# Patient Record
Sex: FEMALE | Race: BLACK OR AFRICAN AMERICAN | NOT HISPANIC OR LATINO
[De-identification: names, ages, dates, MRNs, and addresses within clinical notes are randomized per-mention and may not be internally consistent; named-entity substitution may affect disease eponyms.]

---

## 2017-01-17 ENCOUNTER — OTHER (OUTPATIENT)
Age: 27
End: 2017-01-17

## 2017-01-17 ENCOUNTER — TRANSCRIPTION ENCOUNTER (OUTPATIENT)
Age: 27
End: 2017-01-17

## 2017-06-06 ENCOUNTER — RX RENEWAL (OUTPATIENT)
Age: 27
End: 2017-06-06

## 2017-06-12 ENCOUNTER — APPOINTMENT (OUTPATIENT)
Dept: OBGYN | Facility: CLINIC | Age: 27
End: 2017-06-12

## 2017-06-12 VITALS
SYSTOLIC BLOOD PRESSURE: 118 MMHG | WEIGHT: 150 LBS | BODY MASS INDEX: 24.99 KG/M2 | DIASTOLIC BLOOD PRESSURE: 80 MMHG | HEIGHT: 65 IN

## 2017-06-12 DIAGNOSIS — Z01.419 ENCOUNTER FOR GYNECOLOGICAL EXAMINATION (GENERAL) (ROUTINE) W/OUT ABNORMAL FINDINGS: ICD-10-CM

## 2017-06-12 DIAGNOSIS — Z72.0 TOBACCO USE: ICD-10-CM

## 2017-06-16 LAB
C TRACH RRNA SPEC QL NAA+PROBE: NORMAL
N GONORRHOEA RRNA SPEC QL NAA+PROBE: NORMAL
SOURCE AMPLIFICATION: NORMAL

## 2017-06-30 ENCOUNTER — MEDICATION RENEWAL (OUTPATIENT)
Age: 27
End: 2017-06-30

## 2017-08-23 ENCOUNTER — RX RENEWAL (OUTPATIENT)
Age: 27
End: 2017-08-23

## 2018-01-29 ENCOUNTER — TRANSCRIPTION ENCOUNTER (OUTPATIENT)
Age: 28
End: 2018-01-29

## 2018-04-02 ENCOUNTER — TRANSCRIPTION ENCOUNTER (OUTPATIENT)
Age: 28
End: 2018-04-02

## 2018-06-07 ENCOUNTER — RX RENEWAL (OUTPATIENT)
Age: 28
End: 2018-06-07

## 2018-06-21 ENCOUNTER — TRANSCRIPTION ENCOUNTER (OUTPATIENT)
Age: 28
End: 2018-06-21

## 2018-08-08 ENCOUNTER — RX RENEWAL (OUTPATIENT)
Age: 28
End: 2018-08-08

## 2018-08-09 ENCOUNTER — TRANSCRIPTION ENCOUNTER (OUTPATIENT)
Age: 28
End: 2018-08-09

## 2018-09-10 ENCOUNTER — APPOINTMENT (OUTPATIENT)
Dept: OBGYN | Facility: CLINIC | Age: 28
End: 2018-09-10
Payer: COMMERCIAL

## 2018-09-10 DIAGNOSIS — Z30.09 ENCOUNTER FOR OTHER GENERAL COUNSELING AND ADVICE ON CONTRACEPTION: ICD-10-CM

## 2018-10-01 ENCOUNTER — APPOINTMENT (OUTPATIENT)
Dept: OBGYN | Facility: CLINIC | Age: 28
End: 2018-10-01
Payer: COMMERCIAL

## 2018-10-01 VITALS
DIASTOLIC BLOOD PRESSURE: 60 MMHG | SYSTOLIC BLOOD PRESSURE: 110 MMHG | HEIGHT: 65 IN | BODY MASS INDEX: 25.83 KG/M2 | WEIGHT: 155 LBS

## 2018-10-01 DIAGNOSIS — B00.2 HERPESVIRAL GINGIVOSTOMATITIS AND PHARYNGOTONSILLITIS: ICD-10-CM

## 2018-10-01 DIAGNOSIS — Z30.41 ENCOUNTER FOR SURVEILLANCE OF CONTRACEPTIVE PILLS: ICD-10-CM

## 2018-10-01 PROCEDURE — 99395 PREV VISIT EST AGE 18-39: CPT

## 2018-10-01 RX ORDER — NORETHINDRONE 0.35 MG/1
0.35 TABLET ORAL
Qty: 28 | Refills: 5 | Status: DISCONTINUED | COMMUNITY
Start: 2017-06-06 | End: 2018-10-01

## 2018-10-01 RX ORDER — NORETHINDRONE ACETATE AND ETHINYL ESTRADIOL AND FERROUS FUMARATE 1MG-20(21)
1-20 KIT ORAL DAILY
Qty: 1 | Refills: 5 | Status: DISCONTINUED | COMMUNITY
Start: 2017-06-29 | End: 2018-10-01

## 2018-10-03 LAB
C TRACH RRNA SPEC QL NAA+PROBE: NOT DETECTED
HPV HIGH+LOW RISK DNA PNL CVX: NOT DETECTED
N GONORRHOEA RRNA SPEC QL NAA+PROBE: NOT DETECTED
SOURCE AMPLIFICATION: NORMAL

## 2018-10-05 LAB — CYTOLOGY CVX/VAG DOC THIN PREP: NORMAL

## 2018-12-21 ENCOUNTER — TRANSCRIPTION ENCOUNTER (OUTPATIENT)
Age: 28
End: 2018-12-21

## 2018-12-26 ENCOUNTER — RX RENEWAL (OUTPATIENT)
Age: 28
End: 2018-12-26

## 2019-03-17 NOTE — OB HISTORY
[___] : no pregnancy complications reported [Pregnancy History] : patient received anesthesia [FreeTextEntry1] : 25-year-old  EDC 10/31/16\par Delivered at 40-3 weeks\par \par LMP 1/20 EDC 10/26\par 3/14 7-0/7 10/31\par \par Prenatal Issues\par •	Prior Tobacco use - not smoking in pregnancy\par •	TDAP September 12\par •	Influenza vaccination 10/6\par •	Herpes simplex - Valtrex suppression at 36 weeks\par •	Penicillin allergy\par \par Prenatal labs\par •	G/C (-) March 2016\par •	Electrophoresis normal pattern\par •	RPR (-)\par •	HCAb (-)\par •	HBSAg (-)\par •	Varicella immune\par •	Rubella immune\par •	HIV (-)\par •	TSH 1.77\par •	Blood type O (+)\par •	\par Genetic screen\par •	Progenity (-) female\par •	Ultrascreen (-)\par •	Mod Seq (-)\par •	Fragile X (-)\par •	SMA (-)\par •	CF (-)\par •	LEVEL II – anterior placenta, normal anatomy, Cx long\par •	3rd trimester\par •	CBC 13.4/12.9/37.6/309\par •	HIV (-)\par •	\par

## 2019-03-17 NOTE — HISTORY OF PRESENT ILLNESS
[1 Year Ago] : 1 year ago [Good] : being in good health [Reproductive Age] : is of reproductive age [de-identified] : Oct 2018 [Continuous] : no continuous [Dull] : no dull [Sharp] : no sharp [Throbbing] : no throbbing [Stabbing] : no stabbing [Burning] : no burning [Itching] : no itching [Mass] : no mass [Stinging] : no stinging [Lesion] : no lesion [Soreness] : no soreness [Discharge] : no discharge [Localized Pain] : no localized pain [Mass (___cm)] : no palpable mass [Diffused Pain] : no diffused pain [Nipple Discharge] : no nipple discharge [Skin Color Change] : no skin color change [FreeTextEntry9] : Patient has no Breast related concerns - Patient continues to breast-feed [Hot Flashes] : no hot flashes [Night Sweats] : no night sweats [Vaginal Itching] : no vaginal itching [Dyspareunia] : no dyspareunia [Mood Changes] : no mood changes [FreeTextEntry8] : Patient denies menopausal symptoms [Definite:  ___ (Date)] : the last menstrual period was [unfilled] [Normal Amount/Duration] : was of a normal amount and duration [Spotting Between  Menses] : no spotting between menses [Regular Cycle Intervals] : periods have been regular [Frequency: Q ___ days] : menstrual periods occur approximately every [unfilled] days [Menarche Age: ____] : age at menarche was [unfilled] [Menstrual Cramps] : no menstrual cramps [Currently In Menopause] : not currently in menopause [Experiencing Menopausal Sxs] : not experiencing menopausal symptoms [Sexually Active] : is sexually active [Monogamous] : is monogamous [Age of First Sexual [place] ___] : became sexually active at the age of [unfilled] [Contraception] : uses contraception [Oral Contraceptives] : uses oral contraceptives [Male ___] : [unfilled] male [de-identified] : current partner x 2005

## 2019-03-17 NOTE — CHIEF COMPLAINT
[Follow Up] : follow up GYN visit [FreeTextEntry1] : Patient was last seen  in October 2018\par Issues discussed:\par Annual examination\par Pap smear negative HPV DNA \par Gonorrhea and Chlamydia negative October 2018\par \par Contraception\par Patient discontinued oral contraception to get pregnant\par Patient has been trying for the last 5 months\par \par Family planning\par Ovulation awareness reviewed\par Prenatal vitamins advised

## 2019-03-18 ENCOUNTER — APPOINTMENT (OUTPATIENT)
Dept: OBGYN | Facility: CLINIC | Age: 29
End: 2019-03-18

## 2019-03-26 ENCOUNTER — APPOINTMENT (OUTPATIENT)
Dept: HUMAN REPRODUCTION | Facility: CLINIC | Age: 29
End: 2019-03-26
Payer: COMMERCIAL

## 2019-03-26 PROCEDURE — 99205 OFFICE O/P NEW HI 60 MIN: CPT

## 2019-03-26 PROCEDURE — 76830 TRANSVAGINAL US NON-OB: CPT

## 2019-05-01 ENCOUNTER — APPOINTMENT (OUTPATIENT)
Dept: HUMAN REPRODUCTION | Facility: CLINIC | Age: 29
End: 2019-05-01
Payer: COMMERCIAL

## 2019-05-01 ENCOUNTER — TRANSCRIPTION ENCOUNTER (OUTPATIENT)
Age: 29
End: 2019-05-01

## 2019-05-01 PROCEDURE — 99214 OFFICE O/P EST MOD 30 MIN: CPT

## 2019-05-14 ENCOUNTER — APPOINTMENT (OUTPATIENT)
Dept: HUMAN REPRODUCTION | Facility: CLINIC | Age: 29
End: 2019-05-14
Payer: COMMERCIAL

## 2019-05-14 PROCEDURE — 99213 OFFICE O/P EST LOW 20 MIN: CPT | Mod: 25

## 2019-05-14 PROCEDURE — 36415 COLL VENOUS BLD VENIPUNCTURE: CPT

## 2019-05-14 PROCEDURE — 76817 TRANSVAGINAL US OBSTETRIC: CPT

## 2019-05-22 ENCOUNTER — APPOINTMENT (OUTPATIENT)
Dept: HUMAN REPRODUCTION | Facility: CLINIC | Age: 29
End: 2019-05-22
Payer: COMMERCIAL

## 2019-05-22 PROCEDURE — 76817 TRANSVAGINAL US OBSTETRIC: CPT

## 2019-05-22 PROCEDURE — 36415 COLL VENOUS BLD VENIPUNCTURE: CPT

## 2019-05-22 PROCEDURE — 99213 OFFICE O/P EST LOW 20 MIN: CPT | Mod: 25

## 2019-05-29 ENCOUNTER — APPOINTMENT (OUTPATIENT)
Dept: HUMAN REPRODUCTION | Facility: CLINIC | Age: 29
End: 2019-05-29
Payer: COMMERCIAL

## 2019-05-29 PROCEDURE — 76817 TRANSVAGINAL US OBSTETRIC: CPT

## 2019-05-29 PROCEDURE — 99213 OFFICE O/P EST LOW 20 MIN: CPT | Mod: 25

## 2019-05-31 ENCOUNTER — APPOINTMENT (OUTPATIENT)
Dept: OBGYN | Facility: CLINIC | Age: 29
End: 2019-05-31

## 2019-05-31 NOTE — CHIEF COMPLAINT
[FreeTextEntry1] : \par \par \par Patient was last seen October 2018\par Issues discussed\par Annual examination\par Pap smear negative HPV DNA negative October 2018\par Gonorrhea and Chlamydia negative October 2018\par \par Contraception\par Patient discontinued oral contraception to get pregnant\par Patient has been trying for the last 5 months\par \par Family planning\par Ovulation awareness reviewed\par Prenatal vitamins advised\par \par \par

## 2019-05-31 NOTE — OB HISTORY
[Pregnancy History] : patient received anesthesia [___] : no pregnancy complications reported [FreeTextEntry1] : 25-year-old  EDC 10/31/16\par Delivered at 40-3 weeks\par \par LMP 1/20 EDC 10/26\par 3/14 7-0/7 10/31\par \par Prenatal Issues\par •	Prior Tobacco use - not smoking in pregnancy\par •	TDAP September 12\par •	Influenza vaccination 10/6\par •	Herpes simplex - Valtrex suppression at 36 weeks\par •	Penicillin allergy\par \par Prenatal labs\par •	G/C (-) March 2016\par •	Electrophoresis normal pattern\par •	RPR (-)\par •	HCAb (-)\par •	HBSAg (-)\par •	Varicella immune\par •	Rubella immune\par •	HIV (-)\par •	TSH 1.77\par •	Blood type O (+)\par •	\par Genetic screen\par •	Progenity (-) female\par •	Ultrascreen (-)\par •	Mod Seq (-)\par •	Fragile X (-)\par •	SMA (-)\par •	CF (-)\par •	LEVEL II – anterior placenta, normal anatomy, Cx long\par •	3rd trimester\par •	CBC 13.4/12.9/37.6/309\par •	HIV (-)\par •	\par

## 2019-05-31 NOTE — HISTORY OF PRESENT ILLNESS
[1 Year Ago] : 1 year ago [Good] : being in good health [Reproductive Age] : is of reproductive age [Definite:  ___ (Date)] : the last menstrual period was [unfilled] [Normal Amount/Duration] : was of a normal amount and duration [Regular Cycle Intervals] : periods have been regular [Frequency: Q ___ days] : menstrual periods occur approximately every [unfilled] days [Menarche Age: ____] : age at menarche was [unfilled] [Sexually Active] : is sexually active [Monogamous] : is monogamous [Age of First Sexual [place] ___] : became sexually active at the age of [unfilled] [Contraception] : uses contraception [Oral Contraceptives] : uses oral contraceptives [Male ___] : [unfilled] male [de-identified] : October 2018 [Continuous] : no continuous [Dull] : no dull [Sharp] : no sharp [Stabbing] : no stabbing [Throbbing] : no throbbing [Burning] : no burning [Itching] : no itching [Mass] : no mass [Stinging] : no stinging [Lesion] : no lesion [Soreness] : no soreness [Discharge] : no discharge [Localized Pain] : no localized pain [Mass (___cm)] : no palpable mass [Diffused Pain] : no diffused pain [Nipple Discharge] : no nipple discharge [Skin Color Change] : no skin color change [FreeTextEntry9] : Patient has no Breast related concerns - Patient continues to breast-feed [Hot Flashes] : no hot flashes [Night Sweats] : no night sweats [Vaginal Itching] : no vaginal itching [Dyspareunia] : no dyspareunia [Mood Changes] : no mood changes [FreeTextEntry8] : Patient denies menopausal symptoms [Spotting Between  Menses] : no spotting between menses [Menstrual Cramps] : no menstrual cramps [Currently In Menopause] : not currently in menopause [Experiencing Menopausal Sxs] : not experiencing menopausal symptoms [de-identified] : current partner x 2005

## 2019-06-03 ENCOUNTER — OUTPATIENT (OUTPATIENT)
Dept: OUTPATIENT SERVICES | Facility: HOSPITAL | Age: 29
LOS: 1 days | End: 2019-06-03
Payer: COMMERCIAL

## 2019-06-03 VITALS
TEMPERATURE: 99 F | SYSTOLIC BLOOD PRESSURE: 108 MMHG | WEIGHT: 158.95 LBS | HEIGHT: 65 IN | HEART RATE: 79 BPM | OXYGEN SATURATION: 98 % | RESPIRATION RATE: 18 BRPM | DIASTOLIC BLOOD PRESSURE: 78 MMHG

## 2019-06-03 DIAGNOSIS — Z01.818 ENCOUNTER FOR OTHER PREPROCEDURAL EXAMINATION: ICD-10-CM

## 2019-06-03 DIAGNOSIS — Z98.890 OTHER SPECIFIED POSTPROCEDURAL STATES: Chronic | ICD-10-CM

## 2019-06-03 DIAGNOSIS — Z98.891 HISTORY OF UTERINE SCAR FROM PREVIOUS SURGERY: Chronic | ICD-10-CM

## 2019-06-03 DIAGNOSIS — O02.1 MISSED ABORTION: ICD-10-CM

## 2019-06-03 DIAGNOSIS — J45.909 UNSPECIFIED ASTHMA, UNCOMPLICATED: ICD-10-CM

## 2019-06-03 LAB
BLD GP AB SCN SERPL QL: NEGATIVE — SIGNIFICANT CHANGE UP
HCT VFR BLD CALC: 39.1 % — SIGNIFICANT CHANGE UP (ref 34.5–45)
HGB BLD-MCNC: 13 G/DL — SIGNIFICANT CHANGE UP (ref 11.5–15.5)
MCHC RBC-ENTMCNC: 29.9 PG — SIGNIFICANT CHANGE UP (ref 27–34)
MCHC RBC-ENTMCNC: 33.2 GM/DL — SIGNIFICANT CHANGE UP (ref 32–36)
MCV RBC AUTO: 89.9 FL — SIGNIFICANT CHANGE UP (ref 80–100)
PLATELET # BLD AUTO: 384 K/UL — SIGNIFICANT CHANGE UP (ref 150–400)
RBC # BLD: 4.35 M/UL — SIGNIFICANT CHANGE UP (ref 3.8–5.2)
RBC # FLD: 13.2 % — SIGNIFICANT CHANGE UP (ref 10.3–14.5)
RH IG SCN BLD-IMP: POSITIVE — SIGNIFICANT CHANGE UP
WBC # BLD: 10.98 K/UL — HIGH (ref 3.8–10.5)
WBC # FLD AUTO: 10.98 K/UL — HIGH (ref 3.8–10.5)

## 2019-06-03 PROCEDURE — 86901 BLOOD TYPING SEROLOGIC RH(D): CPT

## 2019-06-03 PROCEDURE — 85027 COMPLETE CBC AUTOMATED: CPT

## 2019-06-03 PROCEDURE — G0463: CPT

## 2019-06-03 PROCEDURE — 86850 RBC ANTIBODY SCREEN: CPT

## 2019-06-03 PROCEDURE — 86900 BLOOD TYPING SEROLOGIC ABO: CPT

## 2019-06-03 RX ORDER — LIDOCAINE HCL 20 MG/ML
0.2 VIAL (ML) INJECTION ONCE
Refills: 0 | Status: DISCONTINUED | OUTPATIENT
Start: 2019-06-06 | End: 2019-06-21

## 2019-06-03 RX ORDER — SODIUM CHLORIDE 9 MG/ML
3 INJECTION INTRAMUSCULAR; INTRAVENOUS; SUBCUTANEOUS EVERY 8 HOURS
Refills: 0 | Status: DISCONTINUED | OUTPATIENT
Start: 2019-06-06 | End: 2019-06-21

## 2019-06-03 NOTE — H&P PST ADULT - NSICDXPROBLEM_GEN_ALL_CORE_FT
PROBLEM DIAGNOSES  Problem: Missed   Assessment and Plan: Dilation and Curettage  on 19  Pre- Op Instructions discussed    CBC,Type and screen sent    Problem: Asthma  Assessment and Plan: continue current medication

## 2019-06-03 NOTE — H&P PST ADULT - HISTORY OF PRESENT ILLNESS
30 y/o female , LMP- 3/31/19 , 8 weeks gestations  with missed  planned for Dilation and curettage for missed  on 19.

## 2019-06-05 ENCOUNTER — TRANSCRIPTION ENCOUNTER (OUTPATIENT)
Age: 29
End: 2019-06-05

## 2019-06-05 RX ORDER — OXYCODONE HYDROCHLORIDE 5 MG/1
5 TABLET ORAL ONCE
Refills: 0 | Status: DISCONTINUED | OUTPATIENT
Start: 2019-06-06 | End: 2019-06-06

## 2019-06-05 RX ORDER — ONDANSETRON 8 MG/1
4 TABLET, FILM COATED ORAL ONCE
Refills: 0 | Status: DISCONTINUED | OUTPATIENT
Start: 2019-06-06 | End: 2019-06-21

## 2019-06-05 RX ORDER — SODIUM CHLORIDE 9 MG/ML
1000 INJECTION, SOLUTION INTRAVENOUS
Refills: 0 | Status: DISCONTINUED | OUTPATIENT
Start: 2019-06-06 | End: 2019-06-21

## 2019-06-05 RX ORDER — CELECOXIB 200 MG/1
200 CAPSULE ORAL ONCE
Refills: 0 | Status: DISCONTINUED | OUTPATIENT
Start: 2019-06-06 | End: 2019-06-21

## 2019-06-06 ENCOUNTER — OUTPATIENT (OUTPATIENT)
Dept: OUTPATIENT SERVICES | Facility: HOSPITAL | Age: 29
LOS: 1 days | End: 2019-06-06
Payer: COMMERCIAL

## 2019-06-06 ENCOUNTER — RESULT REVIEW (OUTPATIENT)
Age: 29
End: 2019-06-06

## 2019-06-06 ENCOUNTER — APPOINTMENT (OUTPATIENT)
Dept: HUMAN REPRODUCTION | Facility: HOSPITAL | Age: 29
End: 2019-06-06
Payer: COMMERCIAL

## 2019-06-06 VITALS
DIASTOLIC BLOOD PRESSURE: 66 MMHG | HEART RATE: 80 BPM | OXYGEN SATURATION: 97 % | SYSTOLIC BLOOD PRESSURE: 98 MMHG | RESPIRATION RATE: 16 BRPM

## 2019-06-06 VITALS
SYSTOLIC BLOOD PRESSURE: 108 MMHG | DIASTOLIC BLOOD PRESSURE: 78 MMHG | HEART RATE: 80 BPM | TEMPERATURE: 99 F | OXYGEN SATURATION: 100 % | WEIGHT: 158.95 LBS | RESPIRATION RATE: 18 BRPM | HEIGHT: 65 IN

## 2019-06-06 DIAGNOSIS — Z98.891 HISTORY OF UTERINE SCAR FROM PREVIOUS SURGERY: Chronic | ICD-10-CM

## 2019-06-06 DIAGNOSIS — Z98.890 OTHER SPECIFIED POSTPROCEDURAL STATES: Chronic | ICD-10-CM

## 2019-06-06 DIAGNOSIS — O02.1 MISSED ABORTION: ICD-10-CM

## 2019-06-06 PROCEDURE — 88280 CHROMOSOME KARYOTYPE STUDY: CPT

## 2019-06-06 PROCEDURE — 59820 CARE OF MISCARRIAGE: CPT

## 2019-06-06 PROCEDURE — 88305 TISSUE EXAM BY PATHOLOGIST: CPT | Mod: 26

## 2019-06-06 PROCEDURE — 88264 CHROMOSOME ANALYSIS 20-25: CPT

## 2019-06-06 PROCEDURE — 88305 TISSUE EXAM BY PATHOLOGIST: CPT

## 2019-06-06 PROCEDURE — 88233 TISSUE CULTURE SKIN/BIOPSY: CPT

## 2019-06-06 RX ORDER — CELECOXIB 200 MG/1
200 CAPSULE ORAL ONCE
Refills: 0 | Status: COMPLETED | OUTPATIENT
Start: 2019-06-06 | End: 2019-06-06

## 2019-06-06 RX ORDER — ACETAMINOPHEN 500 MG
1000 TABLET ORAL ONCE
Refills: 0 | Status: COMPLETED | OUTPATIENT
Start: 2019-06-06 | End: 2019-06-06

## 2019-06-06 RX ADMIN — SODIUM CHLORIDE 3 MILLILITER(S): 9 INJECTION INTRAMUSCULAR; INTRAVENOUS; SUBCUTANEOUS at 10:18

## 2019-06-06 RX ADMIN — CELECOXIB 200 MILLIGRAM(S): 200 CAPSULE ORAL at 10:15

## 2019-06-06 RX ADMIN — Medication 1000 MILLIGRAM(S): at 10:14

## 2019-06-06 NOTE — BRIEF OPERATIVE NOTE - NSICDXBRIEFPROCEDURE_GEN_ALL_CORE_FT
PROCEDURES:  Dilation and curettage, uterus, using suction, for missed first trimester  2019 12:10:03  Laurie Heard

## 2019-06-06 NOTE — ASU DISCHARGE PLAN (ADULT/PEDIATRIC) - CARE PROVIDER_API CALL
Kwasi Ashley)  Obstetrics and Gynecology; Reproductive EndoInfertility  [address] [place] [address]  Phone: [phone]  Fax: [phone]  Follow Up Time:

## 2019-06-06 NOTE — ASU DISCHARGE PLAN (ADULT/PEDIATRIC) - CALL YOUR DOCTOR IF YOU HAVE ANY OF THE FOLLOWING:
Pain not relieved by Medications/Inability to tolerate liquids or foods/Fever greater than (need to indicate Fahrenheit or Celsius)/Bleeding that does not stop/Nausea and vomiting that does not stop/Unable to urinate

## 2019-06-06 NOTE — ASU DISCHARGE PLAN (ADULT/PEDIATRIC) - NURSING INSTRUCTIONS
Next dose of Tylenol will be on or after _____414pm______ ,today/tonight, If needed for pain/cramps. Your first dose of Tylenol was given at ___1014am________. Do not exceed more than 4000mg of Tylenol in one 24 hour setting.

## 2019-06-07 PROBLEM — J45.909 UNSPECIFIED ASTHMA, UNCOMPLICATED: Chronic | Status: ACTIVE | Noted: 2019-06-03

## 2019-06-10 LAB — SURGICAL PATHOLOGY STUDY: SIGNIFICANT CHANGE UP

## 2019-06-17 LAB — CHROM ANALY OVERALL INTERP SPEC-IMP: SIGNIFICANT CHANGE UP

## 2019-06-20 ENCOUNTER — APPOINTMENT (OUTPATIENT)
Dept: HUMAN REPRODUCTION | Facility: CLINIC | Age: 29
End: 2019-06-20
Payer: COMMERCIAL

## 2019-06-20 PROCEDURE — 76830 TRANSVAGINAL US NON-OB: CPT

## 2019-06-20 PROCEDURE — 99213 OFFICE O/P EST LOW 20 MIN: CPT | Mod: 25

## 2019-06-20 PROCEDURE — 84702 CHORIONIC GONADOTROPIN TEST: CPT

## 2019-08-23 ENCOUNTER — APPOINTMENT (OUTPATIENT)
Dept: HUMAN REPRODUCTION | Facility: CLINIC | Age: 29
End: 2019-08-23
Payer: COMMERCIAL

## 2019-08-23 PROCEDURE — 99213 OFFICE O/P EST LOW 20 MIN: CPT | Mod: 25

## 2019-08-23 PROCEDURE — 76830 TRANSVAGINAL US NON-OB: CPT

## 2019-09-19 ENCOUNTER — APPOINTMENT (OUTPATIENT)
Dept: HUMAN REPRODUCTION | Facility: CLINIC | Age: 29
End: 2019-09-19
Payer: COMMERCIAL

## 2019-09-19 PROCEDURE — 76830 TRANSVAGINAL US NON-OB: CPT

## 2019-09-19 PROCEDURE — 99213 OFFICE O/P EST LOW 20 MIN: CPT | Mod: 25

## 2019-09-27 ENCOUNTER — APPOINTMENT (OUTPATIENT)
Dept: HUMAN REPRODUCTION | Facility: CLINIC | Age: 29
End: 2019-09-27
Payer: COMMERCIAL

## 2019-09-27 PROCEDURE — 99213 OFFICE O/P EST LOW 20 MIN: CPT | Mod: 25

## 2019-09-27 PROCEDURE — 76830 TRANSVAGINAL US NON-OB: CPT

## 2019-09-30 ENCOUNTER — APPOINTMENT (OUTPATIENT)
Dept: HUMAN REPRODUCTION | Facility: CLINIC | Age: 29
End: 2019-09-30
Payer: COMMERCIAL

## 2019-09-30 PROCEDURE — 76830 TRANSVAGINAL US NON-OB: CPT

## 2019-09-30 PROCEDURE — 99213 OFFICE O/P EST LOW 20 MIN: CPT | Mod: 25

## 2019-10-02 ENCOUNTER — APPOINTMENT (OUTPATIENT)
Dept: HUMAN REPRODUCTION | Facility: CLINIC | Age: 29
End: 2019-10-02
Payer: COMMERCIAL

## 2019-10-02 ENCOUNTER — APPOINTMENT (OUTPATIENT)
Dept: HUMAN REPRODUCTION | Facility: CLINIC | Age: 29
End: 2019-10-02

## 2019-10-02 PROCEDURE — 58322 ARTIFICIAL INSEMINATION: CPT

## 2019-10-02 PROCEDURE — 76830 TRANSVAGINAL US NON-OB: CPT

## 2019-10-02 PROCEDURE — 89261 SPERM ISOLATION COMPLEX: CPT

## 2019-10-02 PROCEDURE — 99213 OFFICE O/P EST LOW 20 MIN: CPT | Mod: 25

## 2019-10-18 ENCOUNTER — APPOINTMENT (OUTPATIENT)
Dept: HUMAN REPRODUCTION | Facility: CLINIC | Age: 29
End: 2019-10-18
Payer: COMMERCIAL

## 2019-10-18 PROCEDURE — 99213Y: CUSTOM | Mod: 25

## 2019-10-18 PROCEDURE — 76830 TRANSVAGINAL US NON-OB: CPT

## 2019-10-25 ENCOUNTER — APPOINTMENT (OUTPATIENT)
Dept: HUMAN REPRODUCTION | Facility: CLINIC | Age: 29
End: 2019-10-25
Payer: COMMERCIAL

## 2019-10-25 PROCEDURE — 76830 TRANSVAGINAL US NON-OB: CPT

## 2019-10-25 PROCEDURE — 99213 OFFICE O/P EST LOW 20 MIN: CPT | Mod: 25

## 2019-10-29 ENCOUNTER — APPOINTMENT (OUTPATIENT)
Dept: HUMAN REPRODUCTION | Facility: CLINIC | Age: 29
End: 2019-10-29
Payer: COMMERCIAL

## 2019-10-29 ENCOUNTER — APPOINTMENT (OUTPATIENT)
Dept: HUMAN REPRODUCTION | Facility: CLINIC | Age: 29
End: 2019-10-29

## 2019-10-29 PROCEDURE — 99213 OFFICE O/P EST LOW 20 MIN: CPT | Mod: 25

## 2019-10-29 PROCEDURE — 76830 TRANSVAGINAL US NON-OB: CPT

## 2019-10-29 PROCEDURE — 89261 SPERM ISOLATION COMPLEX: CPT

## 2019-10-29 PROCEDURE — 58322 ARTIFICIAL INSEMINATION: CPT

## 2019-11-13 ENCOUNTER — APPOINTMENT (OUTPATIENT)
Dept: HUMAN REPRODUCTION | Facility: CLINIC | Age: 29
End: 2019-11-13
Payer: COMMERCIAL

## 2019-11-13 PROCEDURE — 99213 OFFICE O/P EST LOW 20 MIN: CPT | Mod: 25

## 2019-11-13 PROCEDURE — 36415 COLL VENOUS BLD VENIPUNCTURE: CPT

## 2019-11-13 PROCEDURE — 76830 TRANSVAGINAL US NON-OB: CPT

## 2019-11-26 ENCOUNTER — APPOINTMENT (OUTPATIENT)
Dept: HUMAN REPRODUCTION | Facility: CLINIC | Age: 29
End: 2019-11-26
Payer: COMMERCIAL

## 2019-11-26 PROCEDURE — 76830 TRANSVAGINAL US NON-OB: CPT

## 2019-11-26 PROCEDURE — 58322 ARTIFICIAL INSEMINATION: CPT

## 2019-11-26 PROCEDURE — 99213 OFFICE O/P EST LOW 20 MIN: CPT | Mod: 25

## 2019-11-26 PROCEDURE — 89261 SPERM ISOLATION COMPLEX: CPT

## 2019-12-09 ENCOUNTER — APPOINTMENT (OUTPATIENT)
Dept: HUMAN REPRODUCTION | Facility: CLINIC | Age: 29
End: 2019-12-09

## 2019-12-11 ENCOUNTER — APPOINTMENT (OUTPATIENT)
Dept: HUMAN REPRODUCTION | Facility: CLINIC | Age: 29
End: 2019-12-11

## 2019-12-13 ENCOUNTER — APPOINTMENT (OUTPATIENT)
Dept: HUMAN REPRODUCTION | Facility: CLINIC | Age: 29
End: 2019-12-13

## 2019-12-30 ENCOUNTER — APPOINTMENT (OUTPATIENT)
Dept: HUMAN REPRODUCTION | Facility: CLINIC | Age: 29
End: 2019-12-30
Payer: COMMERCIAL

## 2019-12-30 PROCEDURE — 99214 OFFICE O/P EST MOD 30 MIN: CPT

## 2020-01-07 ENCOUNTER — LABORATORY RESULT (OUTPATIENT)
Age: 30
End: 2020-01-07

## 2020-01-08 ENCOUNTER — APPOINTMENT (OUTPATIENT)
Dept: OBGYN | Facility: CLINIC | Age: 30
End: 2020-01-08
Payer: COMMERCIAL

## 2020-01-08 VITALS
HEIGHT: 65 IN | SYSTOLIC BLOOD PRESSURE: 117 MMHG | DIASTOLIC BLOOD PRESSURE: 78 MMHG | WEIGHT: 163 LBS | BODY MASS INDEX: 27.16 KG/M2

## 2020-01-08 DIAGNOSIS — N97.9 FEMALE INFERTILITY, UNSPECIFIED: ICD-10-CM

## 2020-01-08 PROCEDURE — 99395 PREV VISIT EST AGE 18-39: CPT

## 2020-01-08 NOTE — PHYSICAL EXAM
[Awake] : awake [Alert] : alert [Acute Distress] : no acute distress [Mass] : no breast mass [Axillary LAD] : no axillary lymphadenopathy [Soft] : soft [Nipple Discharge] : no nipple discharge [Tender] : non tender [Oriented x3] : oriented to person, place, and time [No Bleeding] : there was no active vaginal bleeding [Normal] : uterus [Uterine Adnexae] : were not tender and not enlarged

## 2020-01-15 ENCOUNTER — APPOINTMENT (OUTPATIENT)
Dept: HUMAN REPRODUCTION | Facility: CLINIC | Age: 30
End: 2020-01-15
Payer: COMMERCIAL

## 2020-01-15 PROCEDURE — 76830 TRANSVAGINAL US NON-OB: CPT

## 2020-01-15 PROCEDURE — 99213 OFFICE O/P EST LOW 20 MIN: CPT | Mod: 25

## 2020-01-20 ENCOUNTER — APPOINTMENT (OUTPATIENT)
Dept: HUMAN REPRODUCTION | Facility: CLINIC | Age: 30
End: 2020-01-20

## 2020-01-22 ENCOUNTER — APPOINTMENT (OUTPATIENT)
Dept: HUMAN REPRODUCTION | Facility: CLINIC | Age: 30
End: 2020-01-22
Payer: COMMERCIAL

## 2020-01-22 PROCEDURE — 76831 ECHO EXAM UTERUS: CPT

## 2020-01-22 PROCEDURE — 58340 CATHETER FOR HYSTEROGRAPHY: CPT

## 2020-01-22 PROCEDURE — 99213 OFFICE O/P EST LOW 20 MIN: CPT | Mod: 25

## 2020-01-27 ENCOUNTER — APPOINTMENT (OUTPATIENT)
Dept: HUMAN REPRODUCTION | Facility: CLINIC | Age: 30
End: 2020-01-27
Payer: COMMERCIAL

## 2020-01-27 PROCEDURE — 99213 OFFICE O/P EST LOW 20 MIN: CPT | Mod: 25

## 2020-01-27 PROCEDURE — 58322 ARTIFICIAL INSEMINATION: CPT

## 2020-02-12 ENCOUNTER — APPOINTMENT (OUTPATIENT)
Dept: HUMAN REPRODUCTION | Facility: CLINIC | Age: 30
End: 2020-02-12
Payer: COMMERCIAL

## 2020-02-12 PROCEDURE — 99213 OFFICE O/P EST LOW 20 MIN: CPT | Mod: 25

## 2020-02-12 PROCEDURE — 76830 TRANSVAGINAL US NON-OB: CPT

## 2020-02-21 ENCOUNTER — APPOINTMENT (OUTPATIENT)
Dept: HUMAN REPRODUCTION | Facility: CLINIC | Age: 30
End: 2020-02-21

## 2020-02-24 ENCOUNTER — APPOINTMENT (OUTPATIENT)
Dept: HUMAN REPRODUCTION | Facility: CLINIC | Age: 30
End: 2020-02-24
Payer: COMMERCIAL

## 2020-02-24 PROCEDURE — 99213 OFFICE O/P EST LOW 20 MIN: CPT | Mod: 25

## 2020-02-24 PROCEDURE — 58322 ARTIFICIAL INSEMINATION: CPT

## 2020-02-24 PROCEDURE — 89261 SPERM ISOLATION COMPLEX: CPT

## 2020-03-11 ENCOUNTER — APPOINTMENT (OUTPATIENT)
Dept: HUMAN REPRODUCTION | Facility: CLINIC | Age: 30
End: 2020-03-11
Payer: COMMERCIAL

## 2020-03-11 PROCEDURE — 76830 TRANSVAGINAL US NON-OB: CPT

## 2020-03-11 PROCEDURE — 99213 OFFICE O/P EST LOW 20 MIN: CPT | Mod: 25

## 2020-05-29 ENCOUNTER — APPOINTMENT (OUTPATIENT)
Dept: HUMAN REPRODUCTION | Facility: CLINIC | Age: 30
End: 2020-05-29

## 2020-06-09 ENCOUNTER — APPOINTMENT (OUTPATIENT)
Dept: HUMAN REPRODUCTION | Facility: CLINIC | Age: 30
End: 2020-06-09
Payer: COMMERCIAL

## 2020-06-09 PROCEDURE — 36415 COLL VENOUS BLD VENIPUNCTURE: CPT

## 2020-06-09 PROCEDURE — 76830 TRANSVAGINAL US NON-OB: CPT

## 2020-06-09 PROCEDURE — 99213 OFFICE O/P EST LOW 20 MIN: CPT | Mod: 25

## 2020-07-07 ENCOUNTER — APPOINTMENT (OUTPATIENT)
Dept: HUMAN REPRODUCTION | Facility: CLINIC | Age: 30
End: 2020-07-07
Payer: COMMERCIAL

## 2020-07-07 PROCEDURE — 36415 COLL VENOUS BLD VENIPUNCTURE: CPT

## 2020-07-07 PROCEDURE — 99213 OFFICE O/P EST LOW 20 MIN: CPT | Mod: 25

## 2020-07-07 PROCEDURE — 76830 TRANSVAGINAL US NON-OB: CPT

## 2020-07-31 ENCOUNTER — APPOINTMENT (OUTPATIENT)
Dept: HUMAN REPRODUCTION | Facility: CLINIC | Age: 30
End: 2020-07-31
Payer: COMMERCIAL

## 2020-07-31 PROCEDURE — 36415 COLL VENOUS BLD VENIPUNCTURE: CPT

## 2020-07-31 PROCEDURE — 99213 OFFICE O/P EST LOW 20 MIN: CPT | Mod: 25

## 2020-07-31 PROCEDURE — 76830 TRANSVAGINAL US NON-OB: CPT

## 2020-08-04 ENCOUNTER — APPOINTMENT (OUTPATIENT)
Dept: HUMAN REPRODUCTION | Facility: CLINIC | Age: 30
End: 2020-08-04

## 2020-08-10 ENCOUNTER — APPOINTMENT (OUTPATIENT)
Dept: HUMAN REPRODUCTION | Facility: CLINIC | Age: 30
End: 2020-08-10
Payer: COMMERCIAL

## 2020-08-10 PROCEDURE — 99213 OFFICE O/P EST LOW 20 MIN: CPT | Mod: 25

## 2020-08-10 PROCEDURE — 36415 COLL VENOUS BLD VENIPUNCTURE: CPT

## 2020-08-10 PROCEDURE — 76830 TRANSVAGINAL US NON-OB: CPT

## 2020-08-12 ENCOUNTER — APPOINTMENT (OUTPATIENT)
Dept: HUMAN REPRODUCTION | Facility: CLINIC | Age: 30
End: 2020-08-12
Payer: COMMERCIAL

## 2020-08-12 PROCEDURE — 89261 SPERM ISOLATION COMPLEX: CPT

## 2020-08-12 PROCEDURE — 99213 OFFICE O/P EST LOW 20 MIN: CPT | Mod: 25

## 2020-08-12 PROCEDURE — 58322 ARTIFICIAL INSEMINATION: CPT

## 2020-09-10 ENCOUNTER — APPOINTMENT (OUTPATIENT)
Dept: RADIOLOGY | Facility: HOSPITAL | Age: 30
End: 2020-09-10

## 2020-09-10 ENCOUNTER — APPOINTMENT (OUTPATIENT)
Dept: HUMAN REPRODUCTION | Facility: CLINIC | Age: 30
End: 2020-09-10

## 2020-10-01 ENCOUNTER — APPOINTMENT (OUTPATIENT)
Dept: HUMAN REPRODUCTION | Facility: CLINIC | Age: 30
End: 2020-10-01
Payer: COMMERCIAL

## 2020-10-01 PROCEDURE — 99213 OFFICE O/P EST LOW 20 MIN: CPT | Mod: 25

## 2020-10-01 PROCEDURE — 76830 TRANSVAGINAL US NON-OB: CPT

## 2020-10-08 ENCOUNTER — APPOINTMENT (OUTPATIENT)
Dept: HUMAN REPRODUCTION | Facility: CLINIC | Age: 30
End: 2020-10-08
Payer: COMMERCIAL

## 2020-10-08 PROCEDURE — 36415 COLL VENOUS BLD VENIPUNCTURE: CPT

## 2020-10-08 PROCEDURE — 99213 OFFICE O/P EST LOW 20 MIN: CPT | Mod: 25

## 2020-10-08 PROCEDURE — 76830 TRANSVAGINAL US NON-OB: CPT

## 2020-10-11 ENCOUNTER — APPOINTMENT (OUTPATIENT)
Dept: HUMAN REPRODUCTION | Facility: CLINIC | Age: 30
End: 2020-10-11
Payer: COMMERCIAL

## 2020-10-11 PROCEDURE — 99213 OFFICE O/P EST LOW 20 MIN: CPT | Mod: 25

## 2020-10-11 PROCEDURE — 76830 TRANSVAGINAL US NON-OB: CPT

## 2020-10-11 PROCEDURE — 36415 COLL VENOUS BLD VENIPUNCTURE: CPT

## 2020-10-13 ENCOUNTER — APPOINTMENT (OUTPATIENT)
Dept: HUMAN REPRODUCTION | Facility: CLINIC | Age: 30
End: 2020-10-13
Payer: COMMERCIAL

## 2020-10-13 PROCEDURE — 89261 SPERM ISOLATION COMPLEX: CPT

## 2020-10-13 PROCEDURE — 99213 OFFICE O/P EST LOW 20 MIN: CPT | Mod: 25

## 2020-10-13 PROCEDURE — 58322 ARTIFICIAL INSEMINATION: CPT

## 2020-11-06 ENCOUNTER — APPOINTMENT (OUTPATIENT)
Dept: HUMAN REPRODUCTION | Facility: CLINIC | Age: 30
End: 2020-11-06
Payer: COMMERCIAL

## 2020-11-06 PROCEDURE — 76830 TRANSVAGINAL US NON-OB: CPT

## 2020-11-06 PROCEDURE — 99213 OFFICE O/P EST LOW 20 MIN: CPT | Mod: 25

## 2020-11-06 PROCEDURE — 99072 ADDL SUPL MATRL&STAF TM PHE: CPT

## 2020-11-09 ENCOUNTER — APPOINTMENT (OUTPATIENT)
Dept: HUMAN REPRODUCTION | Facility: CLINIC | Age: 30
End: 2020-11-09
Payer: COMMERCIAL

## 2020-11-09 PROCEDURE — 99072 ADDL SUPL MATRL&STAF TM PHE: CPT

## 2020-11-09 PROCEDURE — 89261 SPERM ISOLATION COMPLEX: CPT

## 2020-11-09 PROCEDURE — 99213 OFFICE O/P EST LOW 20 MIN: CPT

## 2020-12-15 PROBLEM — Z01.419 ENCOUNTER FOR ROUTINE PELVIC EXAMINATION: Status: RESOLVED | Noted: 2017-06-12 | Resolved: 2020-12-15

## 2020-12-29 ENCOUNTER — APPOINTMENT (OUTPATIENT)
Dept: HUMAN REPRODUCTION | Facility: CLINIC | Age: 30
End: 2020-12-29
Payer: COMMERCIAL

## 2020-12-29 PROCEDURE — 99072 ADDL SUPL MATRL&STAF TM PHE: CPT

## 2020-12-29 PROCEDURE — 36415 COLL VENOUS BLD VENIPUNCTURE: CPT

## 2020-12-29 PROCEDURE — 99213 OFFICE O/P EST LOW 20 MIN: CPT | Mod: 25

## 2020-12-29 PROCEDURE — 76830 TRANSVAGINAL US NON-OB: CPT

## 2021-01-06 ENCOUNTER — APPOINTMENT (OUTPATIENT)
Dept: HUMAN REPRODUCTION | Facility: CLINIC | Age: 31
End: 2021-01-06
Payer: COMMERCIAL

## 2021-01-06 PROCEDURE — 99213 OFFICE O/P EST LOW 20 MIN: CPT | Mod: 25

## 2021-01-06 PROCEDURE — 99072 ADDL SUPL MATRL&STAF TM PHE: CPT

## 2021-01-06 PROCEDURE — 36415 COLL VENOUS BLD VENIPUNCTURE: CPT

## 2021-01-06 PROCEDURE — 76830 TRANSVAGINAL US NON-OB: CPT

## 2021-01-09 ENCOUNTER — APPOINTMENT (OUTPATIENT)
Dept: HUMAN REPRODUCTION | Facility: CLINIC | Age: 31
End: 2021-01-09
Payer: COMMERCIAL

## 2021-01-09 PROCEDURE — 89261 SPERM ISOLATION COMPLEX: CPT

## 2021-01-09 PROCEDURE — 99213 OFFICE O/P EST LOW 20 MIN: CPT | Mod: 25

## 2021-01-09 PROCEDURE — 99072 ADDL SUPL MATRL&STAF TM PHE: CPT

## 2021-01-09 PROCEDURE — 76830 TRANSVAGINAL US NON-OB: CPT

## 2021-01-09 PROCEDURE — 58322 ARTIFICIAL INSEMINATION: CPT

## 2021-01-26 ENCOUNTER — APPOINTMENT (OUTPATIENT)
Dept: HUMAN REPRODUCTION | Facility: CLINIC | Age: 31
End: 2021-01-26
Payer: COMMERCIAL

## 2021-01-26 PROCEDURE — 36415 COLL VENOUS BLD VENIPUNCTURE: CPT

## 2021-01-26 PROCEDURE — 76830 TRANSVAGINAL US NON-OB: CPT

## 2021-01-26 PROCEDURE — 99072 ADDL SUPL MATRL&STAF TM PHE: CPT

## 2021-01-26 PROCEDURE — 99213 OFFICE O/P EST LOW 20 MIN: CPT | Mod: 25

## 2021-02-03 ENCOUNTER — APPOINTMENT (OUTPATIENT)
Dept: HUMAN REPRODUCTION | Facility: CLINIC | Age: 31
End: 2021-02-03
Payer: COMMERCIAL

## 2021-02-03 PROCEDURE — 99072 ADDL SUPL MATRL&STAF TM PHE: CPT

## 2021-02-03 PROCEDURE — 76830 TRANSVAGINAL US NON-OB: CPT

## 2021-02-03 PROCEDURE — 36415 COLL VENOUS BLD VENIPUNCTURE: CPT

## 2021-02-03 PROCEDURE — 99213 OFFICE O/P EST LOW 20 MIN: CPT | Mod: 25

## 2021-02-05 ENCOUNTER — APPOINTMENT (OUTPATIENT)
Dept: HUMAN REPRODUCTION | Facility: CLINIC | Age: 31
End: 2021-02-05
Payer: COMMERCIAL

## 2021-02-05 PROCEDURE — 99213 OFFICE O/P EST LOW 20 MIN: CPT | Mod: 25

## 2021-02-05 PROCEDURE — 76830 TRANSVAGINAL US NON-OB: CPT

## 2021-02-05 PROCEDURE — 36415 COLL VENOUS BLD VENIPUNCTURE: CPT

## 2021-02-05 PROCEDURE — 99072 ADDL SUPL MATRL&STAF TM PHE: CPT

## 2021-10-11 ENCOUNTER — APPOINTMENT (OUTPATIENT)
Dept: OBGYN | Facility: CLINIC | Age: 31
End: 2021-10-11
Payer: COMMERCIAL

## 2021-10-11 VITALS — DIASTOLIC BLOOD PRESSURE: 80 MMHG | BODY MASS INDEX: 23.3 KG/M2 | SYSTOLIC BLOOD PRESSURE: 113 MMHG | WEIGHT: 140 LBS

## 2021-10-11 DIAGNOSIS — Z01.419 ENCOUNTER FOR GYNECOLOGICAL EXAMINATION (GENERAL) (ROUTINE) W/OUT ABNORMAL FINDINGS: ICD-10-CM

## 2021-10-11 PROCEDURE — 99395 PREV VISIT EST AGE 18-39: CPT

## 2021-10-12 LAB — HPV HIGH+LOW RISK DNA PNL CVX: NOT DETECTED

## 2021-10-15 LAB — CYTOLOGY CVX/VAG DOC THIN PREP: NORMAL

## 2022-07-01 ENCOUNTER — APPOINTMENT (OUTPATIENT)
Dept: OBGYN | Facility: CLINIC | Age: 32
End: 2022-07-01

## 2022-07-01 VITALS — BODY MASS INDEX: 24.3 KG/M2 | WEIGHT: 146 LBS | DIASTOLIC BLOOD PRESSURE: 79 MMHG | SYSTOLIC BLOOD PRESSURE: 112 MMHG

## 2022-07-01 DIAGNOSIS — R43.8 OTHER DISTURBANCES OF SMELL AND TASTE: ICD-10-CM

## 2022-07-01 DIAGNOSIS — R53.83 OTHER FATIGUE: ICD-10-CM

## 2022-07-01 PROCEDURE — 76830 TRANSVAGINAL US NON-OB: CPT

## 2022-07-01 PROCEDURE — 99213 OFFICE O/P EST LOW 20 MIN: CPT | Mod: 25

## 2022-07-01 NOTE — HISTORY OF PRESENT ILLNESS
[FreeTextEntry1] : 33YO  :LMP 4/27 thinks she's pregnant c/o fatigue, metallic taste; 5d ET 5/17/21 EDC 2/2/23.

## 2022-07-01 NOTE — PHYSICAL EXAM
[Labia Majora] : normal [Labia Minora] : normal [Normal] : normal [Tenderness] : nontender [Enlarged ___ wks] : enlarged [unfilled] ~Uweeks [Anteversion] : anteverted [Uterine Adnexae] : normal

## 2022-07-01 NOTE — PROCEDURE
[Transvaginal OB Sonogram] : Transvaginal OB Sonogram [Intrauterine Pregnancy] : intrauterine pregnancy [Yolk Sac] : yolk sac present [Fetal Heart] : fetal heart present [Date: ___] : Date: [unfilled] [Current GA by Sonogram: ___ (wks)] : Current GA by Sonogram: [unfilled]Uwks [___ day(s)] : [unfilled] days [Transvaginal OB Sonogram WNL] : Transvaginal OB Sonogram WNL [FreeTextEntry1] : c/w LMP dating

## 2022-07-08 ENCOUNTER — NON-APPOINTMENT (OUTPATIENT)
Age: 32
End: 2022-07-08

## 2022-07-12 LAB
HCG SERPL-MCNC: ABNORMAL MIU/ML
PROGEST SERPL-MCNC: 26.6 NG/ML

## 2022-07-18 ENCOUNTER — LABORATORY RESULT (OUTPATIENT)
Age: 32
End: 2022-07-18

## 2022-07-18 ENCOUNTER — ASOB RESULT (OUTPATIENT)
Age: 32
End: 2022-07-18

## 2022-07-18 ENCOUNTER — APPOINTMENT (OUTPATIENT)
Dept: ANTEPARTUM | Facility: CLINIC | Age: 32
End: 2022-07-18

## 2022-07-18 ENCOUNTER — APPOINTMENT (OUTPATIENT)
Dept: OBGYN | Facility: CLINIC | Age: 32
End: 2022-07-18

## 2022-07-18 VITALS — SYSTOLIC BLOOD PRESSURE: 90 MMHG | BODY MASS INDEX: 24.63 KG/M2 | DIASTOLIC BLOOD PRESSURE: 50 MMHG | WEIGHT: 148 LBS

## 2022-07-18 PROCEDURE — 76813 OB US NUCHAL MEAS 1 GEST: CPT

## 2022-07-18 PROCEDURE — 0501F PRENATAL FLOW SHEET: CPT

## 2022-07-18 PROCEDURE — 76801 OB US < 14 WKS SINGLE FETUS: CPT | Mod: 59

## 2022-07-18 PROCEDURE — 36416 COLLJ CAPILLARY BLOOD SPEC: CPT

## 2022-07-19 ENCOUNTER — NON-APPOINTMENT (OUTPATIENT)
Age: 32
End: 2022-07-19

## 2022-07-19 LAB
ABO + RH PNL BLD: NORMAL
B19V IGG SER QL IA: 0.67 INDEX
B19V IGG+IGM SER-IMP: NEGATIVE
B19V IGG+IGM SER-IMP: NORMAL
B19V IGM FLD-ACNC: 0.14 INDEX
B19V IGM SER-ACNC: NEGATIVE
BASOPHILS # BLD AUTO: 0.04 K/UL
BASOPHILS NFR BLD AUTO: 0.3 %
CMV IGG SERPL QL: <0.2 U/ML
CMV IGG SERPL-IMP: NEGATIVE
CMV IGM SERPL QL: <8 AU/ML
CMV IGM SERPL QL: NEGATIVE
EOSINOPHIL # BLD AUTO: 0.68 K/UL
EOSINOPHIL NFR BLD AUTO: 5.7 %
HBV SURFACE AG SER QL: NONREACTIVE
HCT VFR BLD CALC: 42.2 %
HCV AB SER QL: NONREACTIVE
HCV S/CO RATIO: 0.1 S/CO
HGB BLD-MCNC: 13.8 G/DL
HIV1+2 AB SPEC QL IA.RAPID: NONREACTIVE
IMM GRANULOCYTES NFR BLD AUTO: 0.3 %
LEAD BLD-MCNC: <1 UG/DL
LYMPHOCYTES # BLD AUTO: 2.37 K/UL
LYMPHOCYTES NFR BLD AUTO: 19.7 %
MAN DIFF?: NORMAL
MCHC RBC-ENTMCNC: 30.1 PG
MCHC RBC-ENTMCNC: 32.7 GM/DL
MCV RBC AUTO: 91.9 FL
MEV IGG FLD QL IA: 203 AU/ML
MEV IGG+IGM SER-IMP: POSITIVE
MONOCYTES # BLD AUTO: 0.76 K/UL
MONOCYTES NFR BLD AUTO: 6.3 %
NEUTROPHILS # BLD AUTO: 8.12 K/UL
NEUTROPHILS NFR BLD AUTO: 67.7 %
PLATELET # BLD AUTO: 311 K/UL
RBC # BLD: 4.59 M/UL
RBC # FLD: 13.2 %
RUBV IGG FLD-ACNC: 2.4 INDEX
RUBV IGG SER-IMP: POSITIVE
T PALLIDUM AB SER QL IA: NEGATIVE
TSH SERPL-ACNC: 1.38 UIU/ML
VZV AB TITR SER: POSITIVE
VZV IGG SER IF-ACNC: 592.5 INDEX
WBC # FLD AUTO: 12.01 K/UL

## 2022-07-20 LAB
BACTERIA UR CULT: NORMAL
C TRACH RRNA SPEC QL NAA+PROBE: NOT DETECTED
N GONORRHOEA RRNA SPEC QL NAA+PROBE: NOT DETECTED
SOURCE AMPLIFICATION: NORMAL

## 2022-07-22 LAB — BLD GP AB SCN SERPL QL: NORMAL

## 2022-07-26 LAB
CLARI ADDITIONAL INFO: NORMAL
CLARI CHROMOSOME 13: NORMAL
CLARI CHROMOSOME 18: NORMAL
CLARI CHROMOSOME 21: NORMAL
CLARI SEX CHROMOSOMES: NORMAL
CLARI TEST COMMENT: NORMAL
CLARITEST NIPT: NORMAL
FETAL FRACT: NORMAL
GESTATION AGE: NORMAL
MATERNAL WEIGHT (LBS):: NORMAL
PLEASE INCLUDE GENDER RESULTS ON THIS REPORT:: NORMAL
TYPE OF PREGNANCY:: NORMAL

## 2022-08-01 ENCOUNTER — NON-APPOINTMENT (OUTPATIENT)
Age: 32
End: 2022-08-01

## 2022-08-01 ENCOUNTER — APPOINTMENT (OUTPATIENT)
Dept: ANTEPARTUM | Facility: CLINIC | Age: 32
End: 2022-08-01

## 2022-08-01 ENCOUNTER — ASOB RESULT (OUTPATIENT)
Age: 32
End: 2022-08-01

## 2022-08-01 PROCEDURE — 76801 OB US < 14 WKS SINGLE FETUS: CPT

## 2022-08-18 ENCOUNTER — NON-APPOINTMENT (OUTPATIENT)
Age: 32
End: 2022-08-18

## 2022-08-18 ENCOUNTER — ASOB RESULT (OUTPATIENT)
Age: 32
End: 2022-08-18

## 2022-08-18 ENCOUNTER — APPOINTMENT (OUTPATIENT)
Dept: ANTEPARTUM | Facility: CLINIC | Age: 32
End: 2022-08-18

## 2022-08-18 ENCOUNTER — APPOINTMENT (OUTPATIENT)
Dept: OBGYN | Facility: CLINIC | Age: 32
End: 2022-08-18

## 2022-08-18 VITALS — SYSTOLIC BLOOD PRESSURE: 100 MMHG | DIASTOLIC BLOOD PRESSURE: 60 MMHG | WEIGHT: 149 LBS | BODY MASS INDEX: 24.79 KG/M2

## 2022-08-18 PROCEDURE — 0502F SUBSEQUENT PRENATAL CARE: CPT

## 2022-08-18 PROCEDURE — 76805 OB US >/= 14 WKS SNGL FETUS: CPT

## 2022-08-29 NOTE — H&P PST ADULT - WEIGHT IN KG
[No JVD] : no jugular venous distention [Normal] : normal rate, regular rhythm, normal S1 and S2 and no murmur heard [No Edema] : there was no peripheral edema [Coordination Grossly Intact] : coordination grossly intact [Normal Gait] : normal gait [Normal Affect] : the affect was normal [Alert and Oriented x3] : oriented to person, place, and time [Normal Mood] : the mood was normal [Normal Insight/Judgement] : insight and judgment were intact 72.1

## 2022-08-30 LAB — AFP PNL SERPL: NORMAL

## 2022-09-12 ENCOUNTER — APPOINTMENT (OUTPATIENT)
Dept: OBGYN | Facility: CLINIC | Age: 32
End: 2022-09-12

## 2022-09-12 ENCOUNTER — NON-APPOINTMENT (OUTPATIENT)
Age: 32
End: 2022-09-12

## 2022-09-12 ENCOUNTER — ASOB RESULT (OUTPATIENT)
Age: 32
End: 2022-09-12

## 2022-09-12 ENCOUNTER — APPOINTMENT (OUTPATIENT)
Dept: ANTEPARTUM | Facility: CLINIC | Age: 32
End: 2022-09-12

## 2022-09-12 VITALS — WEIGHT: 152 LBS | DIASTOLIC BLOOD PRESSURE: 74 MMHG | BODY MASS INDEX: 25.29 KG/M2 | SYSTOLIC BLOOD PRESSURE: 107 MMHG

## 2022-09-12 PROCEDURE — 0502F SUBSEQUENT PRENATAL CARE: CPT

## 2022-09-12 PROCEDURE — 76811 OB US DETAILED SNGL FETUS: CPT

## 2022-09-27 ENCOUNTER — APPOINTMENT (OUTPATIENT)
Dept: PEDIATRIC CARDIOLOGY | Facility: CLINIC | Age: 32
End: 2022-09-27

## 2022-09-28 ENCOUNTER — APPOINTMENT (OUTPATIENT)
Dept: PEDIATRIC CARDIOLOGY | Facility: CLINIC | Age: 32
End: 2022-09-28

## 2022-09-28 PROCEDURE — 76821 MIDDLE CEREBRAL ARTERY ECHO: CPT

## 2022-09-28 PROCEDURE — 99202 OFFICE O/P NEW SF 15 MIN: CPT | Mod: 25

## 2022-09-28 PROCEDURE — 76827 ECHO EXAM OF FETAL HEART: CPT

## 2022-09-28 PROCEDURE — 76825 ECHO EXAM OF FETAL HEART: CPT

## 2022-09-28 PROCEDURE — 76820 UMBILICAL ARTERY ECHO: CPT

## 2022-09-28 PROCEDURE — 93325 DOPPLER ECHO COLOR FLOW MAPG: CPT | Mod: 59

## 2022-10-10 ENCOUNTER — NON-APPOINTMENT (OUTPATIENT)
Age: 32
End: 2022-10-10

## 2022-10-10 ENCOUNTER — APPOINTMENT (OUTPATIENT)
Dept: OBGYN | Facility: CLINIC | Age: 32
End: 2022-10-10

## 2022-10-10 ENCOUNTER — OUTPATIENT (OUTPATIENT)
Dept: OUTPATIENT SERVICES | Facility: HOSPITAL | Age: 32
LOS: 1 days | End: 2022-10-10
Payer: COMMERCIAL

## 2022-10-10 VITALS
DIASTOLIC BLOOD PRESSURE: 78 MMHG | BODY MASS INDEX: 25.83 KG/M2 | WEIGHT: 155 LBS | SYSTOLIC BLOOD PRESSURE: 118 MMHG | HEIGHT: 65 IN

## 2022-10-10 VITALS — DIASTOLIC BLOOD PRESSURE: 58 MMHG | HEART RATE: 92 BPM | OXYGEN SATURATION: 99 % | SYSTOLIC BLOOD PRESSURE: 127 MMHG

## 2022-10-10 VITALS — HEART RATE: 86 BPM | SYSTOLIC BLOOD PRESSURE: 106 MMHG | DIASTOLIC BLOOD PRESSURE: 73 MMHG

## 2022-10-10 DIAGNOSIS — Z3A.00 WEEKS OF GESTATION OF PREGNANCY NOT SPECIFIED: ICD-10-CM

## 2022-10-10 DIAGNOSIS — Z98.890 OTHER SPECIFIED POSTPROCEDURAL STATES: Chronic | ICD-10-CM

## 2022-10-10 DIAGNOSIS — Z98.891 HISTORY OF UTERINE SCAR FROM PREVIOUS SURGERY: Chronic | ICD-10-CM

## 2022-10-10 DIAGNOSIS — O26.899 OTHER SPECIFIED PREGNANCY RELATED CONDITIONS, UNSPECIFIED TRIMESTER: ICD-10-CM

## 2022-10-10 LAB
AMNISURE ROM (RUPTURE OF MEMBRANES): NEGATIVE — SIGNIFICANT CHANGE UP
BASOPHILS # BLD AUTO: 0.03 K/UL
BASOPHILS NFR BLD AUTO: 0.2 %
EOSINOPHIL # BLD AUTO: 0.74 K/UL
EOSINOPHIL NFR BLD AUTO: 5.7 %
HCT VFR BLD CALC: 39.7 %
HGB BLD-MCNC: 13.1 G/DL
IMM GRANULOCYTES NFR BLD AUTO: 0.5 %
LYMPHOCYTES # BLD AUTO: 1.91 K/UL
LYMPHOCYTES NFR BLD AUTO: 14.8 %
MAN DIFF?: NORMAL
MCHC RBC-ENTMCNC: 31.1 PG
MCHC RBC-ENTMCNC: 33 GM/DL
MCV RBC AUTO: 94.3 FL
MONOCYTES # BLD AUTO: 0.84 K/UL
MONOCYTES NFR BLD AUTO: 6.5 %
NEUTROPHILS # BLD AUTO: 9.28 K/UL
NEUTROPHILS NFR BLD AUTO: 72.3 %
PLATELET # BLD AUTO: 367 K/UL
RBC # BLD: 4.21 M/UL
RBC # FLD: 13.8 %
WBC # FLD AUTO: 12.87 K/UL

## 2022-10-10 PROCEDURE — G0463: CPT

## 2022-10-10 PROCEDURE — 84112 EVAL AMNIOTIC FLUID PROTEIN: CPT

## 2022-10-10 PROCEDURE — 99213 OFFICE O/P EST LOW 20 MIN: CPT

## 2022-10-10 PROCEDURE — 0502F SUBSEQUENT PRENATAL CARE: CPT

## 2022-10-10 PROCEDURE — 59025 FETAL NON-STRESS TEST: CPT

## 2022-10-10 NOTE — OB PROVIDER TRIAGE NOTE - NSOBPROVIDERNOTE_OBGYN_ALL_OB_FT
Assessment  31yo  @23w4d presents for r/o ROM. Patient stable. No evidence of rupture of membranes on exam.    Plan  1. Amnisure performed - negative  2. Patient stable for discharge home  3. Return precautions discussed    Patient discussed with attending physician, Dr. Douglas.  Noa Sharpe MD PGY3

## 2022-10-10 NOTE — OB PROVIDER TRIAGE NOTE - ADDITIONAL INSTRUCTIONS
Follow up with your provider as scheduled for routine prenatal care. Return to Labor and Delivery if you experience severely regular and painful contractions, decreased fetal movement, leakage of fluid, or vaginal bleeding.

## 2022-10-10 NOTE — OB RN TRIAGE NOTE - FALL HARM RISK - HARM RISK INTERVENTIONS

## 2022-10-10 NOTE — OB PROVIDER TRIAGE NOTE - NSHPPHYSICALEXAM_GEN_ALL_CORE
Objective  – Vital Signs  Vital Signs Last 24 Hrs  T(C): 36.7 (10 Oct 2022 14:17), Max: 36.7 (10 Oct 2022 13:36)  T(F): 98.1 (10 Oct 2022 14:17), Max: 98.1 (10 Oct 2022 13:36)  HR: 86 (10 Oct 2022 16:53) (80 - 92)  BP: 106/73 (10 Oct 2022 16:53) (106/73 - 127/58)  BP(mean): --  RR: 16 (10 Oct 2022 14:17) (16 - 18)  SpO2: 92% (10 Oct 2022 16:49) (84% - 100%)    – PE:   CV: RRR  Pulm: breathing comfortably on RA  Abd: gravid, nontender  Extr: moving all extremities with ease  – Spec: no pooling, nitrazine neg, ferning neg, no bleeding, cervix visually closed  – FHR 153bpm  – [place]: not isaac  – Sono: vertex, LE 15.84

## 2022-10-10 NOTE — OB RN TRIAGE NOTE - DOMESTIC TRAVEL HIGH RISK QUESTION
Anticoagulation Progress Note    Indication: Cerebral Infarction  Goal INR: 2-3  INR Result: 4.1    70 year old male returns to the [facility] for a follow-up visit after 2 weeks.      Assessment  (+) bleeding, bruising or thromboembolic complications: pt states he had one episode of hematuria  (-) missed/extra warfarin doses  (-) medication changes  (+) dietary changes: pt stopped eating greens entirely after the last INR check. He was previously eating a lot of cooked Spinach 3-5 times per week  (-) alcohol  (-) smoking  (-) activity level changes  (-) hospital visits, ER visits, interruptions in therapy  (-) illness/infection, injuries, or falls    Plan   -Pt's INR=4.1 today, which is above the desired therapeutic range of 2-3.  -Pt's INR has increased from 1.9 at the last visit.    -Pt has been taking warfarin 13 mg weekly.  -Plan to have the pt hold the warfarin dose today, then continue current dose.   -Pt will resume eating greens at least 3x per week.  -Pt to return to clinic in 2 weeks (6/9/21).     No

## 2022-10-10 NOTE — OB PROVIDER TRIAGE NOTE - HISTORY OF PRESENT ILLNESS
Triage H&P    Subjective  HPI: 31yo  @23w4d presents for evaluation of leakage of fluid. Patient reports feeling wetness in her underwear repeatedly for the past 2-3 days. She was evaluated in the office for r/o ROM and nitrazine was weakly positive, so patient was sent in for further evaluation and Amnisure. +FM. -CTXs. -VB. Pt denies any other concerns.    – PNC: IVF pregnancy. Denies prenatal issues.  – OBHx:   2016-FT C/S / NRFHT w/ ROM, 2cm dilated  2019- SAB s/p D&C  2019- SAB, no D&C  – GynHx: denies  – PMH: asthma (uses ProAir and Advair inhalers daily)  – PSH: C/S, D&C, L Leg surgery  – Psych: denies   – Social: former smoker (1 pack/week - quit in )  – Meds: PNV   – Allergies: NKDA  – Will accept blood transfusions? Yes

## 2022-10-11 LAB
GLUCOSE 1H P 50 G GLC PO SERPL-MCNC: 123 MG/DL
T PALLIDUM AB SER QL IA: NEGATIVE

## 2022-10-31 ENCOUNTER — NON-APPOINTMENT (OUTPATIENT)
Age: 32
End: 2022-10-31

## 2022-11-01 ENCOUNTER — NON-APPOINTMENT (OUTPATIENT)
Age: 32
End: 2022-11-01

## 2022-11-08 ENCOUNTER — APPOINTMENT (OUTPATIENT)
Dept: ANTEPARTUM | Facility: CLINIC | Age: 32
End: 2022-11-08

## 2022-11-08 ENCOUNTER — ASOB RESULT (OUTPATIENT)
Age: 32
End: 2022-11-08

## 2022-11-08 ENCOUNTER — APPOINTMENT (OUTPATIENT)
Dept: OBGYN | Facility: CLINIC | Age: 32
End: 2022-11-08

## 2022-11-08 VITALS
SYSTOLIC BLOOD PRESSURE: 100 MMHG | WEIGHT: 153.38 LBS | DIASTOLIC BLOOD PRESSURE: 65 MMHG | BODY MASS INDEX: 25.52 KG/M2

## 2022-11-08 PROCEDURE — 76816 OB US FOLLOW-UP PER FETUS: CPT

## 2022-11-08 PROCEDURE — 81003 URINALYSIS AUTO W/O SCOPE: CPT | Mod: QW

## 2022-11-08 PROCEDURE — 76818 FETAL BIOPHYS PROFILE W/NST: CPT | Mod: 59

## 2022-11-08 PROCEDURE — 0502F SUBSEQUENT PRENATAL CARE: CPT

## 2022-11-23 ENCOUNTER — APPOINTMENT (OUTPATIENT)
Dept: ANTEPARTUM | Facility: CLINIC | Age: 32
End: 2022-11-23

## 2022-12-09 ENCOUNTER — NON-APPOINTMENT (OUTPATIENT)
Age: 32
End: 2022-12-09

## 2022-12-09 ENCOUNTER — APPOINTMENT (OUTPATIENT)
Dept: ANTEPARTUM | Facility: CLINIC | Age: 32
End: 2022-12-09

## 2022-12-09 ENCOUNTER — APPOINTMENT (OUTPATIENT)
Dept: OBGYN | Facility: CLINIC | Age: 32
End: 2022-12-09

## 2022-12-09 ENCOUNTER — ASOB RESULT (OUTPATIENT)
Age: 32
End: 2022-12-09

## 2022-12-09 VITALS
BODY MASS INDEX: 26.52 KG/M2 | SYSTOLIC BLOOD PRESSURE: 113 MMHG | HEIGHT: 65 IN | DIASTOLIC BLOOD PRESSURE: 77 MMHG | WEIGHT: 159.19 LBS

## 2022-12-09 PROCEDURE — 81003 URINALYSIS AUTO W/O SCOPE: CPT | Mod: QW

## 2022-12-09 PROCEDURE — 0502F SUBSEQUENT PRENATAL CARE: CPT

## 2022-12-09 PROCEDURE — 76816 OB US FOLLOW-UP PER FETUS: CPT

## 2022-12-19 ENCOUNTER — NON-APPOINTMENT (OUTPATIENT)
Age: 32
End: 2022-12-19

## 2022-12-27 ENCOUNTER — APPOINTMENT (OUTPATIENT)
Dept: OBGYN | Facility: CLINIC | Age: 32
End: 2022-12-27
Payer: COMMERCIAL

## 2022-12-27 VITALS
BODY MASS INDEX: 27.16 KG/M2 | SYSTOLIC BLOOD PRESSURE: 114 MMHG | DIASTOLIC BLOOD PRESSURE: 75 MMHG | HEIGHT: 65 IN | WEIGHT: 163 LBS

## 2022-12-27 PROCEDURE — 0502F SUBSEQUENT PRENATAL CARE: CPT

## 2022-12-27 PROCEDURE — 81003 URINALYSIS AUTO W/O SCOPE: CPT | Mod: QW

## 2022-12-29 ENCOUNTER — APPOINTMENT (OUTPATIENT)
Dept: ANTEPARTUM | Facility: CLINIC | Age: 32
End: 2022-12-29
Payer: COMMERCIAL

## 2022-12-29 ENCOUNTER — ASOB RESULT (OUTPATIENT)
Age: 32
End: 2022-12-29

## 2022-12-29 PROCEDURE — 76816 OB US FOLLOW-UP PER FETUS: CPT

## 2023-01-04 ENCOUNTER — NON-APPOINTMENT (OUTPATIENT)
Age: 33
End: 2023-01-04

## 2023-01-09 ENCOUNTER — APPOINTMENT (OUTPATIENT)
Dept: OBGYN | Facility: CLINIC | Age: 33
End: 2023-01-09
Payer: COMMERCIAL

## 2023-01-09 VITALS
DIASTOLIC BLOOD PRESSURE: 60 MMHG | HEIGHT: 65 IN | BODY MASS INDEX: 27.49 KG/M2 | SYSTOLIC BLOOD PRESSURE: 110 MMHG | WEIGHT: 165 LBS

## 2023-01-09 DIAGNOSIS — O34.219 MATERNAL CARE FOR UNSPECIFIED TYPE SCAR FROM PREVIOUS CESAREAN DELIVERY: ICD-10-CM

## 2023-01-09 DIAGNOSIS — Z34.90 ENCOUNTER FOR SUPERVISION OF NORMAL PREGNANCY, UNSPECIFIED, UNSPECIFIED TRIMESTER: ICD-10-CM

## 2023-01-09 DIAGNOSIS — N94.89 OTHER SPECIFIED CONDITIONS ASSOCIATED WITH FEMALE GENITAL ORGANS AND MENSTRUAL CYCLE: ICD-10-CM

## 2023-01-09 DIAGNOSIS — Z01.419 ENCOUNTER FOR GYNECOLOGICAL EXAMINATION (GENERAL) (ROUTINE) W/OUT ABNORMAL FINDINGS: ICD-10-CM

## 2023-01-09 DIAGNOSIS — Z87.42 PERSONAL HISTORY OF OTHER DISEASES OF THE FEMALE GENITAL TRACT: ICD-10-CM

## 2023-01-09 DIAGNOSIS — N92.6 IRREGULAR MENSTRUATION, UNSPECIFIED: ICD-10-CM

## 2023-01-09 DIAGNOSIS — Z98.891 HISTORY OF UTERINE SCAR FROM PREVIOUS SURGERY: ICD-10-CM

## 2023-01-09 PROCEDURE — 99080 SPECIAL REPORTS OR FORMS: CPT

## 2023-01-09 PROCEDURE — 81003 URINALYSIS AUTO W/O SCOPE: CPT | Mod: QW

## 2023-01-09 PROCEDURE — 0502F SUBSEQUENT PRENATAL CARE: CPT

## 2023-01-11 ENCOUNTER — OUTPATIENT (OUTPATIENT)
Dept: OUTPATIENT SERVICES | Facility: HOSPITAL | Age: 33
LOS: 1 days | End: 2023-01-11
Payer: COMMERCIAL

## 2023-01-11 VITALS
HEIGHT: 65 IN | DIASTOLIC BLOOD PRESSURE: 72 MMHG | TEMPERATURE: 98 F | HEART RATE: 108 BPM | WEIGHT: 160.94 LBS | RESPIRATION RATE: 18 BRPM | SYSTOLIC BLOOD PRESSURE: 106 MMHG | OXYGEN SATURATION: 96 %

## 2023-01-11 DIAGNOSIS — Z01.818 ENCOUNTER FOR OTHER PREPROCEDURAL EXAMINATION: ICD-10-CM

## 2023-01-11 DIAGNOSIS — Z98.890 OTHER SPECIFIED POSTPROCEDURAL STATES: Chronic | ICD-10-CM

## 2023-01-11 DIAGNOSIS — Z98.891 HISTORY OF UTERINE SCAR FROM PREVIOUS SURGERY: ICD-10-CM

## 2023-01-11 DIAGNOSIS — Z98.891 HISTORY OF UTERINE SCAR FROM PREVIOUS SURGERY: Chronic | ICD-10-CM

## 2023-01-11 DIAGNOSIS — Z34.90 ENCOUNTER FOR SUPERVISION OF NORMAL PREGNANCY, UNSPECIFIED, UNSPECIFIED TRIMESTER: ICD-10-CM

## 2023-01-11 DIAGNOSIS — J45.909 UNSPECIFIED ASTHMA, UNCOMPLICATED: ICD-10-CM

## 2023-01-11 PROCEDURE — 86900 BLOOD TYPING SEROLOGIC ABO: CPT

## 2023-01-11 PROCEDURE — 86850 RBC ANTIBODY SCREEN: CPT

## 2023-01-11 PROCEDURE — G0463: CPT

## 2023-01-11 PROCEDURE — 86905 BLOOD TYPING RBC ANTIGENS: CPT

## 2023-01-11 PROCEDURE — 86901 BLOOD TYPING SEROLOGIC RH(D): CPT

## 2023-01-11 PROCEDURE — 86077 PHYS BLOOD BANK SERV XMATCH: CPT

## 2023-01-11 PROCEDURE — 86870 RBC ANTIBODY IDENTIFICATION: CPT

## 2023-01-11 PROCEDURE — 86880 COOMBS TEST DIRECT: CPT

## 2023-01-11 NOTE — OB PST NOTE - FALL HARM RISK - ATTEMPT OOB
Left a message to TM regarding red bot r/t possible exposure. Advised to callback at the  hotline number for follow up. Routed to appropriate pool.    No

## 2023-01-11 NOTE — OB PST NOTE - ASSESSMENT
MARCELINOI VTE 2.0 SCORE [CLOT updated 2019]    AGE RELATED RISK FACTORS                                                       MOBILITY RELATED FACTORS  [ ] Age 41-60 years                                            (1 Point)                    [ ] Bed rest                                                        (1 Point)  [ ] Age: 61-74 years                                           (2 Points)                  [ ] Plaster cast                                                   (2 Points)  [ ] Age= 75 years                                              (3 Points)                    [ ] Bed bound for more than 72 hours                 (2 Points)    DISEASE RELATED RISK FACTORS                                               GENDER SPECIFIC FACTORS  [ ] Edema in the lower extremities                       (1 Point)              [ x] Pregnancy                                                     (1 Point)  [ ] Varicose veins                                               (1 Point)                     [ ] Post-partum < 6 weeks                                   (1 Point)             [x ] BMI > 25 Kg/m2                                            (1 Point)                     [ ] Hormonal therapy  or oral contraception          (1 Point)                 [ ] Sepsis (in the previous month)                        (1 Point)               [ ] History of pregnancy complications                 (1 point)  [ ] Pneumonia or serious lung disease                                               [ ] Unexplained or recurrent                     (1 Point)           (in the previous month)                               (1 Point)  [x ] Abnormal pulmonary function test                     (1 Point)                 SURGERY RELATED RISK FACTORS  [ ] Acute myocardial infarction                              (1 Point)               [x ]  Section                                             (1 Point)  [ ] Congestive heart failure (in the previous month)  (1 Point)      [ ] Minor surgery                                                  (1 Point)   [ ] Inflammatory bowel disease                             (1 Point)               [ ] Arthroscopic surgery                                        (2 Points)  [ ] Central venous access                                      (2 Points)                [ ] General surgery lasting more than 45 minutes (2 points)  [ ] Malignancy- Present or previous                   (2 Points)                [ ] Elective arthroplasty                                         (5 points)    [ ] Stroke (in the previous month)                          (5 Points)                                                                                                                                                           HEMATOLOGY RELATED FACTORS                                                 TRAUMA RELATED RISK FACTORS  [ ] Prior episodes of VTE                                     (3 Points)                [ ] Fracture of the hip, pelvis, or leg                       (5 Points)  [ ] Positive family history for VTE                         (3 Points)             [ ] Acute spinal cord injury (in the previous month)  (5 Points)  [ ] Prothrombin 16853 A                                     (3 Points)               [ ] Paralysis  (less than 1 month)                             (5 Points)  [ ] Factor V Leiden                                             (3 Points)                  [ ] Multiple Trauma within 1 month                        (5 Points)  [ ] Lupus anticoagulants                                     (3 Points)                                                           [ ] Anticardiolipin antibodies                               (3 Points)                                                       [ ] High homocysteine in the blood                      (3 Points)                                             [ ] Other congenital or acquired thrombophilia      (3 Points)                                                [ ] Heparin induced thrombocytopenia                  (3 Points)                                     Total Score [   4       ]

## 2023-01-11 NOTE — OB PST NOTE - HISTORY OF PRESENT ILLNESS
32yr old female coming in for repeat   EDC 2023. Pt had IVF. Last  was failure to progress. Pt denies HTN or diabetes. Has had worsening of eczema and asthma symptoms. Hx of covid 2022 mild symptoms.    Note; covid test 2023 [facility]

## 2023-01-12 PROBLEM — Z87.42 HISTORY OF AMENORRHEA: Status: RESOLVED | Noted: 2019-05-20 | Resolved: 2023-01-12

## 2023-01-12 PROBLEM — N92.6 MISSED MENSES: Status: RESOLVED | Noted: 2022-07-01 | Resolved: 2023-01-12

## 2023-01-12 PROBLEM — Z01.419 WELL FEMALE EXAM WITH ROUTINE GYNECOLOGICAL EXAM: Status: RESOLVED | Noted: 2017-06-12 | Resolved: 2023-01-12

## 2023-01-12 PROBLEM — O34.219 H/O CESAREAN SECTION COMPLICATING PREGNANCY: Status: ACTIVE | Noted: 2022-12-09

## 2023-01-12 PROBLEM — Z34.90 ENCOUNTER FOR PREGNANCY RELATED EXAMINATION: Status: RESOLVED | Noted: 2022-07-18 | Resolved: 2023-01-12

## 2023-01-12 LAB
GP B STREP DNA SPEC QL NAA+PROBE: NOT DETECTED
SOURCE GBS: NORMAL

## 2023-01-12 RX ORDER — CLOMIPHENE CITRATE 50 MG/1
50 TABLET ORAL DAILY
Qty: 5 | Refills: 0 | Status: COMPLETED | COMMUNITY
Start: 2019-08-23 | End: 2023-01-12

## 2023-01-12 RX ORDER — CHORIOGONADOTROPIN ALFA 250 UG/.5ML
250 INJECTION, SOLUTION SUBCUTANEOUS
Qty: 1 | Refills: 0 | Status: COMPLETED | COMMUNITY
Start: 2019-08-23 | End: 2023-01-12

## 2023-01-12 RX ORDER — CLOMIPHENE CITRATE 50 MG/1
50 TABLET ORAL DAILY
Qty: 5 | Refills: 0 | Status: COMPLETED | COMMUNITY
Start: 2019-09-19 | End: 2023-01-12

## 2023-01-17 ENCOUNTER — APPOINTMENT (OUTPATIENT)
Dept: ANTEPARTUM | Facility: CLINIC | Age: 33
End: 2023-01-17
Payer: COMMERCIAL

## 2023-01-17 ENCOUNTER — ASOB RESULT (OUTPATIENT)
Age: 33
End: 2023-01-17

## 2023-01-17 ENCOUNTER — NON-APPOINTMENT (OUTPATIENT)
Age: 33
End: 2023-01-17

## 2023-01-17 ENCOUNTER — INPATIENT (INPATIENT)
Facility: HOSPITAL | Age: 33
LOS: 3 days | Discharge: HOME | End: 2023-01-21
Attending: OBSTETRICS & GYNECOLOGY | Admitting: OBSTETRICS & GYNECOLOGY
Payer: COMMERCIAL

## 2023-01-17 ENCOUNTER — TRANSCRIPTION ENCOUNTER (OUTPATIENT)
Age: 33
End: 2023-01-17

## 2023-01-17 VITALS — SYSTOLIC BLOOD PRESSURE: 101 MMHG | DIASTOLIC BLOOD PRESSURE: 65 MMHG | HEART RATE: 104 BPM | TEMPERATURE: 98 F

## 2023-01-17 DIAGNOSIS — Z98.890 OTHER SPECIFIED POSTPROCEDURAL STATES: Chronic | ICD-10-CM

## 2023-01-17 DIAGNOSIS — Z3A.00 WEEKS OF GESTATION OF PREGNANCY NOT SPECIFIED: ICD-10-CM

## 2023-01-17 DIAGNOSIS — Z98.891 HISTORY OF UTERINE SCAR FROM PREVIOUS SURGERY: Chronic | ICD-10-CM

## 2023-01-17 DIAGNOSIS — O26.899 OTHER SPECIFIED PREGNANCY RELATED CONDITIONS, UNSPECIFIED TRIMESTER: ICD-10-CM

## 2023-01-17 DIAGNOSIS — Z34.80 ENCOUNTER FOR SUPERVISION OF OTHER NORMAL PREGNANCY, UNSPECIFIED TRIMESTER: ICD-10-CM

## 2023-01-17 PROBLEM — Z87.2 PERSONAL HISTORY OF DISEASES OF THE SKIN AND SUBCUTANEOUS TISSUE: Chronic | Status: ACTIVE | Noted: 2023-01-11

## 2023-01-17 LAB
BASOPHILS # BLD AUTO: 0.04 K/UL — SIGNIFICANT CHANGE UP (ref 0–0.2)
BASOPHILS NFR BLD AUTO: 0.3 % — SIGNIFICANT CHANGE UP (ref 0–2)
BLD GP AB SCN SERPL QL: POSITIVE — SIGNIFICANT CHANGE UP
EOSINOPHIL # BLD AUTO: 0.62 K/UL — HIGH (ref 0–0.5)
EOSINOPHIL NFR BLD AUTO: 5 % — SIGNIFICANT CHANGE UP (ref 0–6)
HCT VFR BLD CALC: 36.7 % — SIGNIFICANT CHANGE UP (ref 34.5–45)
HGB BLD-MCNC: 12.5 G/DL — SIGNIFICANT CHANGE UP (ref 11.5–15.5)
IMM GRANULOCYTES NFR BLD AUTO: 0.8 % — SIGNIFICANT CHANGE UP (ref 0–0.9)
LYMPHOCYTES # BLD AUTO: 17.4 % — SIGNIFICANT CHANGE UP (ref 13–44)
LYMPHOCYTES # BLD AUTO: 2.16 K/UL — SIGNIFICANT CHANGE UP (ref 1–3.3)
MCHC RBC-ENTMCNC: 30.5 PG — SIGNIFICANT CHANGE UP (ref 27–34)
MCHC RBC-ENTMCNC: 34.1 GM/DL — SIGNIFICANT CHANGE UP (ref 32–36)
MCV RBC AUTO: 89.5 FL — SIGNIFICANT CHANGE UP (ref 80–100)
MONOCYTES # BLD AUTO: 1.22 K/UL — HIGH (ref 0–0.9)
MONOCYTES NFR BLD AUTO: 9.9 % — SIGNIFICANT CHANGE UP (ref 2–14)
NEUTROPHILS # BLD AUTO: 8.24 K/UL — HIGH (ref 1.8–7.4)
NEUTROPHILS NFR BLD AUTO: 66.6 % — SIGNIFICANT CHANGE UP (ref 43–77)
NRBC # BLD: 0 /100 WBCS — SIGNIFICANT CHANGE UP (ref 0–0)
PLATELET # BLD AUTO: 309 K/UL — SIGNIFICANT CHANGE UP (ref 150–400)
RBC # BLD: 4.1 M/UL — SIGNIFICANT CHANGE UP (ref 3.8–5.2)
RBC # FLD: 13.7 % — SIGNIFICANT CHANGE UP (ref 10.3–14.5)
RH IG SCN BLD-IMP: POSITIVE — SIGNIFICANT CHANGE UP
WBC # BLD: 12.38 K/UL — HIGH (ref 3.8–10.5)
WBC # FLD AUTO: 12.38 K/UL — HIGH (ref 3.8–10.5)

## 2023-01-17 PROCEDURE — 76818 FETAL BIOPHYS PROFILE W/NST: CPT

## 2023-01-17 RX ORDER — OXYTOCIN 10 UNIT/ML
333.33 VIAL (ML) INJECTION
Qty: 20 | Refills: 0 | Status: COMPLETED | OUTPATIENT
Start: 2023-01-17 | End: 2023-01-18

## 2023-01-17 RX ORDER — CITRIC ACID/SODIUM CITRATE 300-500 MG
30 SOLUTION, ORAL ORAL ONCE
Refills: 0 | Status: COMPLETED | OUTPATIENT
Start: 2023-01-17 | End: 2023-01-18

## 2023-01-17 RX ORDER — FAMOTIDINE 10 MG/ML
20 INJECTION INTRAVENOUS ONCE
Refills: 0 | Status: COMPLETED | OUTPATIENT
Start: 2023-01-17 | End: 2023-01-18

## 2023-01-17 RX ORDER — SODIUM CHLORIDE 9 MG/ML
1000 INJECTION, SOLUTION INTRAVENOUS
Refills: 0 | Status: DISCONTINUED | OUTPATIENT
Start: 2023-01-17 | End: 2023-01-17

## 2023-01-17 RX ORDER — SODIUM CHLORIDE 9 MG/ML
1000 INJECTION, SOLUTION INTRAVENOUS ONCE
Refills: 0 | Status: COMPLETED | OUTPATIENT
Start: 2023-01-17 | End: 2023-01-18

## 2023-01-17 RX ORDER — SODIUM CHLORIDE 9 MG/ML
1000 INJECTION, SOLUTION INTRAVENOUS
Refills: 0 | Status: DISCONTINUED | OUTPATIENT
Start: 2023-01-17 | End: 2023-01-18

## 2023-01-17 RX ADMIN — SODIUM CHLORIDE 125 MILLILITER(S): 9 INJECTION, SOLUTION INTRAVENOUS at 22:07

## 2023-01-17 NOTE — OB PROVIDER TRIAGE NOTE - HISTORY OF PRESENT ILLNESS
HPI: 33yo F  @37+5 sent in from the office for 2 decels on otherwise reactive NST today. BPP . +FM. -LOF. -CTXs. -VB. Pt denies any other concerns.    Patient has a planned repeat C/S for 23.    PNC: Denies prenatal issues.   GBS neg  EFW 2622g by  ivory.    -FT C/S  NRFHT w/ ROM, 2cm dilated  - SAB s/p D&C  2019- SAB, no D&C  – GynHx: denies  – PMH: asthma (uses ProAir and Advair inhalers daily)  – PSH: C/S, D&C, L Leg surgery  – Psych: denies   – Social: former smoker (1 pack/week - quit in )  – Meds: PNV   – Allergies: NKDA  – Will accept blood transfusions? Yes

## 2023-01-17 NOTE — OB PROVIDER H&P - NS_GBS_INFANT_INVASIVE_OBGYN_ALL_OB_FT
Chart reviewed.  I notified Ni that she should take all of her medications like normal.  No need to hold Eliquis.    She verbalized understanding.    Lexis Griffiths RN     Patient states no history

## 2023-01-17 NOTE — OB PROVIDER TRIAGE NOTE - ATTENDING COMMENTS
Sent for prolonged monitoring for decel on NST after complaint of decreased fetal movement all morning  Arrived in office in PM for eval  BPP 8/8 w noted decel as above    Plan for prolonged monitoring and reeval plan    anticapted d/c home after  2 hours

## 2023-01-17 NOTE — OB PROVIDER TRIAGE NOTE - NSHPPHYSICALEXAM_GEN_ALL_CORE
T(C): 36.9 (01-17-23 @ 17:15), Max: 36.9 (01-17-23 @ 17:15)  HR: 98 (01-17-23 @ 17:57) (92 - 106)  BP: 101/65 (01-17-23 @ 17:15) (101/65 - 115/79)  RR: 20 (01-17-23 @ 16:21) (20 - 20)  SpO2: 97% (01-17-23 @ 17:57) (97% - 99%)    Gen: NAD  CV: RRR  Pulm: breathing comfortably on RA  Abd: gravid, nontender  Extr: moving all extremities with ease  – NST reactive: 150, mod variability, +accels, intermittent variable  – [place]: absent  – Sono: nahomy breech, ant placenta

## 2023-01-17 NOTE — OB PROVIDER H&P - NSICDXPASTSURGICALHX_GEN_ALL_CORE_FT
PAST SURGICAL HISTORY:  H/O foot surgery left leg- childhood    History of  section 2016    History of colonoscopy

## 2023-01-17 NOTE — OB PROVIDER H&P - HISTORY OF PRESENT ILLNESS
33yo F  @37+5 sent in from the office for 2 decels on otherwise reactive NST today and BPP .  In triage prolonged monitoring revealed persistence of spontaneous decelerations, no contractions noted.     +FM. -LOF. -CTXs. -VB. Pt denies any other concerns.  Last ate at 2pm.    Patient has a planned repeat C/S for 23.    PNC: Denies prenatal issues.   GBS neg  EFW 2622g by  ivory.    -FT C/S  NRFHT w/ ROM, 2cm dilated  2019- SAB s/p D&C  2019- SAB, no D&C  – GynHx: denies  – PMH: asthma (uses ProAir and Advair inhalers daily)  – PSH: C/S, D&C, L Leg surgery  – Psych: denies   – Social: former smoker (1 pack/week - quit in )  – Meds: PNV   – Allergies: NKDA  – Will accept blood transfusions? Yes 33yo F  @37+5 sent in from the office for 2 decels on otherwise reactive NST today and BPP .  In triage prolonged monitoring revealed persistence of spontaneous decelerations, no contractions noted.     +FM. -LOF. -CTXs. -VB. Pt denies any other concerns.  Last ate at 2pm.    Patient has a planned repeat C/S for 23.    PNC: Denies prenatal issues.   GBS neg  EFW 2622g by  ivory.    -FT C/S  NRFHT w/ ROM, 2cm dilated  2019- SAB s/p D&C  2019- SAB, no D&C  – GynHx: denies  – PMH: asthma (uses ProAir and Advair inhalers daily)  – PSH: C/S, D&C, L Leg surgery  – Psych: denies   – Social: former smoker (1 pack/week - quit in )  – Meds: PNV   – Allergies: PCN allergy documented (unknown reaction)  – Will accept blood transfusions? Yes

## 2023-01-17 NOTE — OB RN TRIAGE NOTE - FALL HARM RISK - UNIVERSAL INTERVENTIONS
Bed in lowest position, wheels locked, appropriate side rails in place/Call bell, personal items and telephone in reach/Instruct patient to call for assistance before getting out of bed or chair/Non-slip footwear when patient is out of bed/[place] to call system/Physically safe environment - no spills, clutter or unnecessary equipment/Purposeful Proactive Rounding/Room/bathroom lighting operational, light cord in reach

## 2023-01-17 NOTE — OB PROVIDER H&P - ASSESSMENT
33yo F  @37w5d initially sent from office for prolonged deceleration, found to have persistant FHR abnormalities on prolonged monitoring.  Patient admitted for repeat  delivery.  NPO since 2pm.    Plan  -admit to L&D  -IVF  -NPO  -anesthesia consult  -for OR    d/w Dr. Royal Gardner, PGY4

## 2023-01-17 NOTE — OB RN TRIAGE NOTE - NS_OBGYNHISTORY_OBGYN_ALL_OB_FT
X1 C/S 2016- FTP  X2 MABs with X 1 D&C  X1 TOP with D&C  Asthma- Albuterol-prn, [place]ix[place]-Q Daily  SX: Left leg tendon 11yrs ago Breech- this pregnancy  X1 C/S 2016- FTP  X2 MABs with X 1 D&C  X1 TOP with D&C  Asthma- Albuterol-prn, Wixela-Q Daily  SX: Left leg tendon 11yrs ago

## 2023-01-17 NOTE — OB PROVIDER H&P - ATTENDING COMMENTS
P1 @ 37+ wks presents from office after complaint of decreased fetal movement w noted decels on NST but BPP: 8/8  planned prolonged monitoring and found to have variable and  spontaneous decel   initially noted accels, now no accels and intermittent decels  no complaints--no Ha, CP, SOB    vitals wnl  FHR: 150, mod variability, no accels, occ decels  toco: none  ve: def  sono (earlier today) breech    recommend delivery as abnormal FHR @ term (during discussion at 11: 30 pm)  prior C/S w plan for repeat C/S @ 39 wks  pt NPO    discussed reasons for recommendation cause of spontaneous decels  awaiting  to arrive    A MD Royal  attending

## 2023-01-17 NOTE — OB PROVIDER H&P - NSHPPHYSICALEXAM_GEN_ALL_CORE
Gen: NAD  CV: RRR  Pulm: breathing comfortably on RA  Abd: gravid, nontender  Extr: moving all extremities with ease  – NST reactive: 150, mod variability, +accels, intermittent variable  – [place]: absent  – Sono: nahomy breech, ant placenta

## 2023-01-17 NOTE — OB PROVIDER H&P - NS_OBGYNHISTORY_OBGYN_ALL_OB_FT
Breech- this pregnancy  X1 C/S 2016- FTP  X2 MABs with X 1 D&C  X1 TOP with D&C  Asthma- Albuterol-prn, [place]ix[place]-Q Daily  SX: Left leg tendon 11yrs ago

## 2023-01-17 NOTE — OB RN PATIENT PROFILE - NSICDXPASTSURGICALHX_GEN_ALL_CORE_FT
Waiting for  to review. Sent referral message to ensure it was received on  workqueue   PAST SURGICAL HISTORY:  H/O foot surgery left leg- childhood    History of  section 2016    History of colonoscopy     History of D&C     One previous induced termination of pregnancy

## 2023-01-17 NOTE — OB PROVIDER TRIAGE NOTE - NSOBPROVIDERNOTE_OBGYN_ALL_OB_FT
31yo F  @37+5 sent in from the office for prolonged monitoring after decelerations. NST reactive so far with rare variable, BPP 8/8.    Plan  - Prolonged monitoring until 7:30p and reassess    Patient discussed with attending physician, Dr. Paige.    MARIELLE Thorpe, PGY2

## 2023-01-18 DIAGNOSIS — Z98.890 OTHER SPECIFIED POSTPROCEDURAL STATES: Chronic | ICD-10-CM

## 2023-01-18 LAB
APTT BLD: 28 SEC — SIGNIFICANT CHANGE UP (ref 27.5–35.5)
COVID-19 SPIKE DOMAIN AB INTERP: POSITIVE
COVID-19 SPIKE DOMAIN ANTIBODY RESULT: >250 U/ML — HIGH
HCT VFR BLD CALC: 36.1 % — SIGNIFICANT CHANGE UP (ref 34.5–45)
HGB BLD-MCNC: 12.2 G/DL — SIGNIFICANT CHANGE UP (ref 11.5–15.5)
INR BLD: 1.11 RATIO — SIGNIFICANT CHANGE UP (ref 0.88–1.16)
MCHC RBC-ENTMCNC: 30.8 PG — SIGNIFICANT CHANGE UP (ref 27–34)
MCHC RBC-ENTMCNC: 33.8 GM/DL — SIGNIFICANT CHANGE UP (ref 32–36)
MCV RBC AUTO: 91.2 FL — SIGNIFICANT CHANGE UP (ref 80–100)
NRBC # BLD: 0 /100 WBCS — SIGNIFICANT CHANGE UP (ref 0–0)
PLATELET # BLD AUTO: 269 K/UL — SIGNIFICANT CHANGE UP (ref 150–400)
PROTHROM AB SERPL-ACNC: 12.9 SEC — SIGNIFICANT CHANGE UP (ref 10.5–13.4)
RBC # BLD: 3.96 M/UL — SIGNIFICANT CHANGE UP (ref 3.8–5.2)
RBC # FLD: 13.7 % — SIGNIFICANT CHANGE UP (ref 10.3–14.5)
SARS-COV-2 IGG+IGM SERPL QL IA: >250 U/ML — HIGH
SARS-COV-2 IGG+IGM SERPL QL IA: POSITIVE
SARS-COV-2 RNA SPEC QL NAA+PROBE: SIGNIFICANT CHANGE UP
T PALLIDUM AB TITR SER: NEGATIVE — SIGNIFICANT CHANGE UP
WBC # BLD: 18.15 K/UL — HIGH (ref 3.8–10.5)
WBC # FLD AUTO: 18.15 K/UL — HIGH (ref 3.8–10.5)

## 2023-01-18 PROCEDURE — 59514 CESAREAN DELIVERY ONLY: CPT | Mod: AS,U7

## 2023-01-18 PROCEDURE — 86077 PHYS BLOOD BANK SERV XMATCH: CPT

## 2023-01-18 RX ORDER — FAMOTIDINE 10 MG/ML
20 INJECTION INTRAVENOUS ONCE
Refills: 0 | Status: COMPLETED | OUTPATIENT
Start: 2023-01-18 | End: 2023-01-18

## 2023-01-18 RX ORDER — CITRIC ACID/SODIUM CITRATE 300-500 MG
30 SOLUTION, ORAL ORAL ONCE
Refills: 0 | Status: COMPLETED | OUTPATIENT
Start: 2023-01-18 | End: 2023-01-18

## 2023-01-18 RX ORDER — GENTAMICIN SULFATE 40 MG/ML
400 VIAL (ML) INJECTION ONCE
Refills: 0 | Status: DISCONTINUED | OUTPATIENT
Start: 2023-01-18 | End: 2023-01-19

## 2023-01-18 RX ORDER — KETOROLAC TROMETHAMINE 30 MG/ML
30 SYRINGE (ML) INJECTION EVERY 6 HOURS
Refills: 0 | Status: DISCONTINUED | OUTPATIENT
Start: 2023-01-18 | End: 2023-01-20

## 2023-01-18 RX ORDER — DEXAMETHASONE 0.5 MG/5ML
4 ELIXIR ORAL EVERY 6 HOURS
Refills: 0 | Status: DISCONTINUED | OUTPATIENT
Start: 2023-01-18 | End: 2023-01-19

## 2023-01-18 RX ORDER — NALOXONE HYDROCHLORIDE 4 MG/.1ML
0.1 SPRAY NASAL
Refills: 0 | Status: DISCONTINUED | OUTPATIENT
Start: 2023-01-18 | End: 2023-01-19

## 2023-01-18 RX ORDER — CITRIC ACID/SODIUM CITRATE 300-500 MG
15 SOLUTION, ORAL ORAL ONCE
Refills: 0 | Status: DISCONTINUED | OUTPATIENT
Start: 2023-01-18 | End: 2023-01-21

## 2023-01-18 RX ORDER — MORPHINE SULFATE 50 MG/1
0.1 CAPSULE, EXTENDED RELEASE ORAL ONCE
Refills: 0 | Status: DISCONTINUED | OUTPATIENT
Start: 2023-01-18 | End: 2023-01-19

## 2023-01-18 RX ORDER — NALBUPHINE HYDROCHLORIDE 10 MG/ML
2.5 INJECTION, SOLUTION INTRAMUSCULAR; INTRAVENOUS; SUBCUTANEOUS EVERY 6 HOURS
Refills: 0 | Status: DISCONTINUED | OUTPATIENT
Start: 2023-01-18 | End: 2023-01-19

## 2023-01-18 RX ORDER — SODIUM CHLORIDE 9 MG/ML
1000 INJECTION, SOLUTION INTRAVENOUS
Refills: 0 | Status: DISCONTINUED | OUTPATIENT
Start: 2023-01-18 | End: 2023-01-21

## 2023-01-18 RX ORDER — MAGNESIUM HYDROXIDE 400 MG/1
30 TABLET, CHEWABLE ORAL
Refills: 0 | Status: DISCONTINUED | OUTPATIENT
Start: 2023-01-18 | End: 2023-01-21

## 2023-01-18 RX ORDER — DIPHENHYDRAMINE HCL 50 MG
25 CAPSULE ORAL EVERY 6 HOURS
Refills: 0 | Status: DISCONTINUED | OUTPATIENT
Start: 2023-01-18 | End: 2023-01-21

## 2023-01-18 RX ORDER — ACETAMINOPHEN 500 MG
1000 TABLET ORAL EVERY 6 HOURS
Refills: 0 | Status: DISCONTINUED | OUTPATIENT
Start: 2023-01-18 | End: 2023-01-18

## 2023-01-18 RX ORDER — SIMETHICONE 80 MG/1
80 TABLET, CHEWABLE ORAL EVERY 4 HOURS
Refills: 0 | Status: DISCONTINUED | OUTPATIENT
Start: 2023-01-18 | End: 2023-01-21

## 2023-01-18 RX ORDER — OXYCODONE HYDROCHLORIDE 5 MG/1
5 TABLET ORAL ONCE
Refills: 0 | Status: DISCONTINUED | OUTPATIENT
Start: 2023-01-18 | End: 2023-01-21

## 2023-01-18 RX ORDER — LANOLIN
1 OINTMENT (GRAM) TOPICAL EVERY 6 HOURS
Refills: 0 | Status: DISCONTINUED | OUTPATIENT
Start: 2023-01-18 | End: 2023-01-21

## 2023-01-18 RX ORDER — OXYCODONE HYDROCHLORIDE 5 MG/1
5 TABLET ORAL
Refills: 0 | Status: COMPLETED | OUTPATIENT
Start: 2023-01-18 | End: 2023-01-25

## 2023-01-18 RX ORDER — OXYTOCIN 10 UNIT/ML
333.33 VIAL (ML) INJECTION
Qty: 20 | Refills: 0 | Status: DISCONTINUED | OUTPATIENT
Start: 2023-01-18 | End: 2023-01-21

## 2023-01-18 RX ORDER — ACETAMINOPHEN 500 MG
975 TABLET ORAL EVERY 6 HOURS
Refills: 0 | Status: COMPLETED | OUTPATIENT
Start: 2023-01-18 | End: 2023-12-17

## 2023-01-18 RX ORDER — HEPARIN SODIUM 5000 [USP'U]/ML
5000 INJECTION INTRAVENOUS; SUBCUTANEOUS EVERY 12 HOURS
Refills: 0 | Status: DISCONTINUED | OUTPATIENT
Start: 2023-01-18 | End: 2023-01-21

## 2023-01-18 RX ORDER — ACETAMINOPHEN 500 MG
975 TABLET ORAL EVERY 6 HOURS
Refills: 0 | Status: DISCONTINUED | OUTPATIENT
Start: 2023-01-18 | End: 2023-01-21

## 2023-01-18 RX ORDER — ONDANSETRON 8 MG/1
4 TABLET, FILM COATED ORAL EVERY 6 HOURS
Refills: 0 | Status: DISCONTINUED | OUTPATIENT
Start: 2023-01-18 | End: 2023-01-19

## 2023-01-18 RX ORDER — OXYCODONE HYDROCHLORIDE 5 MG/1
5 TABLET ORAL
Refills: 0 | Status: DISCONTINUED | OUTPATIENT
Start: 2023-01-18 | End: 2023-01-18

## 2023-01-18 RX ORDER — ACETAMINOPHEN 500 MG
1000 TABLET ORAL ONCE
Refills: 0 | Status: DISCONTINUED | OUTPATIENT
Start: 2023-01-18 | End: 2023-01-18

## 2023-01-18 RX ORDER — TETANUS TOXOID, REDUCED DIPHTHERIA TOXOID AND ACELLULAR PERTUSSIS VACCINE, ADSORBED 5; 2.5; 8; 8; 2.5 [IU]/.5ML; [IU]/.5ML; UG/.5ML; UG/.5ML; UG/.5ML
0.5 SUSPENSION INTRAMUSCULAR ONCE
Refills: 0 | Status: DISCONTINUED | OUTPATIENT
Start: 2023-01-18 | End: 2023-01-21

## 2023-01-18 RX ADMIN — Medication 400 MILLIGRAM(S): at 14:01

## 2023-01-18 RX ADMIN — Medication 30 MILLIGRAM(S): at 15:09

## 2023-01-18 RX ADMIN — Medication 30 MILLILITER(S): at 02:52

## 2023-01-18 RX ADMIN — HEPARIN SODIUM 5000 UNIT(S): 5000 INJECTION INTRAVENOUS; SUBCUTANEOUS at 20:20

## 2023-01-18 RX ADMIN — Medication 30 MILLILITER(S): at 10:25

## 2023-01-18 RX ADMIN — SODIUM CHLORIDE 2000 MILLILITER(S): 9 INJECTION, SOLUTION INTRAVENOUS at 11:01

## 2023-01-18 RX ADMIN — Medication 1000 MILLIUNIT(S)/MIN: at 13:58

## 2023-01-18 RX ADMIN — Medication 30 MILLIGRAM(S): at 22:10

## 2023-01-18 RX ADMIN — Medication 30 MILLIGRAM(S): at 21:44

## 2023-01-18 RX ADMIN — FAMOTIDINE 20 MILLIGRAM(S): 10 INJECTION INTRAVENOUS at 10:25

## 2023-01-18 RX ADMIN — FAMOTIDINE 20 MILLIGRAM(S): 10 INJECTION INTRAVENOUS at 02:52

## 2023-01-18 NOTE — OB RN DELIVERY SUMMARY - NS_SEPSISRSKCALC_OBGYN_ALL_OB_FT
EOS calculated successfully. EOS Risk Factor: 0.5/1000 live births ([facility] [place] incidence); GA=37w6d; Temp=98.78; ROM=0.033; GBS='Negative'; Antibiotics='No antibiotics or any antibiotics < 2 hrs prior to birth'

## 2023-01-18 NOTE — OB RN PREOPERATIVE CHECKLIST - DNR CLARIFICATION FORM COMPLETED
Daryl Dc presents with questions about permanent hair removal and an indent under her chin . Allergies reviewed. Medications reconciled. She is not on a blood thinner.  She reports no nicotine use. She reports rare alcohol use. She is a retired . She has family support consisting of her  and two kids. Her physical activity includes exercising  two times per week. Review of systems shows no hx of heart problems, no hx of lung problems, and no hx of DVT/PE.    n/a

## 2023-01-18 NOTE — OB PROVIDER DELIVERY SUMMARY - NSPLACINTACT_OBGYN_ALL_OB
Yes

## 2023-01-18 NOTE — OB PROVIDER LABOR PROGRESS NOTE - NS_OBIHIFHRDETAILS_OBGYN_ALL_OB_FT
150, minimal variability, no accels, few decels/late decels (suspect late w upside down ctx on toco)
Cat 2
145, min/moderate variabilty, no accels,  loss of information (presumed late decel)

## 2023-01-18 NOTE — OB NEONATOLOGY/PEDIATRICIAN DELIVERY SUMMARY - NSPEDSNEONOTESA_OBGYN_ALL_OB_FT
Requested by OB to attend delivery of 37 5/7 week male infant born via primary c/s for breech and NRFHT. Mother was at OB office for decreased fetal movement and admitted for NRFHT. Mother is 31yo , O pos, prenatal labs neg/NR/Imm, GBS neg on . AROM clear at delivery. Infant delivered footling breech, nuchal cord x1, and emerged dusky with good tone and weak cry. Routine resuscitation provided and infant responded with strong cry and improvement in color. Strong cry, pink, active. Stable  to be admitted to N. Apgars 8/9. Mother wishes to breastfeed, consents to Hep B and declines circumcision.

## 2023-01-18 NOTE — PRE-ANESTHESIA EVALUATION ADULT - NSANTHPMHFT_GEN_ALL_CORE
37.5 weeks gestation with decels /breech; FT C/S 2/2 NRFHT w/ ROM, 2cm dilated; left leg surgery; ex-smoker   2019- SAB s/p D&C  2019- SAB, no D&C  asthma on proair and Albuterol-prn, Wixela-Q Daily

## 2023-01-18 NOTE — OB PROVIDER DELIVERY SUMMARY - NSPROVIDERDELIVERYNOTE_OBGYN_ALL_OB_FT
unscheduled repeat LTCS for h/o prior c/s, breech presentation, cat 2 NST, decreased FM  Viable male infant, apgars 9/9, weight 6lbs 6oz, footling breech, nuchal arm, nuchal cord  Hysterotomy closed in 1 layer using PDS  Grossly normal uterus, tubes, and ovaries  Abdomen closed in standard fashion  Pt and infant to recovery in stable condition  EBL: 1200  IVF: 2750  UOP: 50 unscheduled repeat LTCS for h/o prior c/s, breech presentation, cat 2 NST, decreased FM  Viable male infant, apgars 9/9, weight 6lbs 6oz, footling breech, nuchal left arm, tight nuchal cord  Hysterotomy closed in 1 layer using PDS  Grossly normal uterus, tubes, and ovaries  Abdomen closed in standard fashion  Pt and infant to recovery in stable condition  EBL: 1200  IVF: 2750  UOP: 50  Dictation # [id number] unscheduled repeat LTCS for h/o prior c/s, breech presentation, cat 2 NST, decreased FM  Viable male infant, apgars 9/9, weight 6lbs 6oz, footling breech, nuchal left arm, tight nuchal cord  Hysterotomy closed in 1 layer using PDS  Grossly normal uterus, tubes, and ovaries  Abdomen closed in standard fashion  Pt and infant to recovery in stable condition  EBL: 1200  IVF: 2750  UOP: 50  Dictation # [id number]    ATTG:  Due to excessive blood loss during the C/S, we will observe pt in PACU for 4 hours, repeat CBC, and if stable, she can be safely transferred to PP floor.

## 2023-01-18 NOTE — OB PROVIDER LABOR PROGRESS NOTE - ASSESSMENT
33YO P1 @ 37+ wks GA with Cat 2 FHR remote from delivery with breech presentation and previous C/S.  Will proceed with repeat C/S.
P1 , prior C/S and breech, @ 37+ 6 w intermittent deceleration  of unknown cause   desire natural progression to labor or scheduled delivery date  IVF pregnancy    pt refusing C/S but agrees to continued monitoring  accepts blood    no indication of hypoxia but continued deceleration and continue to recommend delivery    PT w serum paula ab a and b  type and cross, 2 units available       KITTY Paige MD  attending    
P1 @ 37+ 6 w spontaneous decels, refusing delivery  agrees to observation and currently implying delivery if emergent    Pt unwilling to state plan if continued deceleration or threshold for delivery    advised of desire to avoid fetal compromise at term but no sign of hypoxia at this time    will observe per pt plan    KITTY Paige MD  attending

## 2023-01-18 NOTE — OB PROVIDER LABOR PROGRESS NOTE - NS_SUBJECTIVE/OBJECTIVE_OBGYN_ALL_OB_FT
arrived and he desires to wait for delivery and go home  asking question waiting for natural labor and if changes related to breech position     PT now declines  C/S delivery    pt has taken her her Albuterol  s/p ~ 2 liters of IV fluid    ICU Vital Signs Last 24 Hrs  T(C): 36.6 (18 Jan 2023 03:23), Max: 36.9 (17 Jan 2023 17:15)  T(F): 97.88 (18 Jan 2023 03:23), Max: 98.42 (17 Jan 2023 17:15)  HR: 89 (18 Jan 2023 04:12) (82 - 111)  BP: 115/76 (18 Jan 2023 03:23) (101/65 - 115/79)  RR: 18 (18 Jan 2023 03:23) (16 - 20)  SpO2: 97% (18 Jan 2023 04:12) (87% - 100%)    O2 Parameters below as of 17 Jan 2023 23:49  Patient On (Oxygen Delivery Method): room air                          12.5   12.38 )-----------( 309      ( 17 Jan 2023 21:49 )             36.7
Pt seen and evaluated. I explained again the need for repeat C/S secondary to breech presentation and previous  Section in this 37+wk baby who continues to have decelerations in the face of a reactive NST.  Pt presently agrees to proceed with abdominal delivery.  I reexplained Risks/Benefits of procedure including but not limited to blood loss, infection, damage to adjacent organs. She understands and wishes to proceed.
Shortly after pt refused note decels and advised pt and    states "if it were up to me they would go home", advised potential of fetal harm w/o observation and he stated if it were god will/fate, That  there would be a sign of problem and they would return     Asked pt w return of decels, what is her threshold for delivery, how many decels will they wait for delivery

## 2023-01-18 NOTE — OB RN INTRAOPERATIVE NOTE - NSSELHIDDEN_OBGYN_ALL_OB_FT
[NS_DeliveryAttending1_OBGYN_ALL_OB_FT:[email or web address]=],[NS_DeliveryAssist1_OBGYN_ALL_OB_FT:[email or web address]=],[NS_DeliveryRN_OBGYN_ALL_OB_FT:[id number]==]

## 2023-01-18 NOTE — OB PROVIDER DELIVERY SUMMARY - NSSELHIDDEN_OBGYN_ALL_OB_FT
[NS_DeliveryAttending1_OBGYN_ALL_OB_FT:[email or web address]=],[NS_DeliveryAssist1_OBGYN_ALL_OB_FT:[email or web address]=]

## 2023-01-19 ENCOUNTER — APPOINTMENT (OUTPATIENT)
Dept: OBGYN | Facility: CLINIC | Age: 33
End: 2023-01-19

## 2023-01-19 LAB
BASOPHILS # BLD AUTO: 0 K/UL — SIGNIFICANT CHANGE UP (ref 0–0.2)
BASOPHILS NFR BLD AUTO: 0 % — SIGNIFICANT CHANGE UP (ref 0–2)
EOSINOPHIL # BLD AUTO: 0.15 K/UL — SIGNIFICANT CHANGE UP (ref 0–0.5)
EOSINOPHIL NFR BLD AUTO: 0.9 % — SIGNIFICANT CHANGE UP (ref 0–6)
HCT VFR BLD CALC: 30.1 % — LOW (ref 34.5–45)
HGB BLD-MCNC: 10.1 G/DL — LOW (ref 11.5–15.5)
LYMPHOCYTES # BLD AUTO: 1.29 K/UL — SIGNIFICANT CHANGE UP (ref 1–3.3)
LYMPHOCYTES # BLD AUTO: 7.7 % — LOW (ref 13–44)
MCHC RBC-ENTMCNC: 30.4 PG — SIGNIFICANT CHANGE UP (ref 27–34)
MCHC RBC-ENTMCNC: 33.6 GM/DL — SIGNIFICANT CHANGE UP (ref 32–36)
MCV RBC AUTO: 90.7 FL — SIGNIFICANT CHANGE UP (ref 80–100)
MONOCYTES # BLD AUTO: 0.87 K/UL — SIGNIFICANT CHANGE UP (ref 0–0.9)
MONOCYTES NFR BLD AUTO: 5.2 % — SIGNIFICANT CHANGE UP (ref 2–14)
NEUTROPHILS # BLD AUTO: 14.44 K/UL — HIGH (ref 1.8–7.4)
NEUTROPHILS NFR BLD AUTO: 86.2 % — HIGH (ref 43–77)
PLATELET # BLD AUTO: 269 K/UL — SIGNIFICANT CHANGE UP (ref 150–400)
RBC # BLD: 3.32 M/UL — LOW (ref 3.8–5.2)
RBC # FLD: 13.5 % — SIGNIFICANT CHANGE UP (ref 10.3–14.5)
WBC # BLD: 16.75 K/UL — HIGH (ref 3.8–10.5)
WBC # FLD AUTO: 16.75 K/UL — HIGH (ref 3.8–10.5)

## 2023-01-19 RX ORDER — IBUPROFEN 200 MG
600 TABLET ORAL EVERY 6 HOURS
Refills: 0 | Status: DISCONTINUED | OUTPATIENT
Start: 2023-01-19 | End: 2023-01-21

## 2023-01-19 RX ORDER — OXYCODONE HYDROCHLORIDE 5 MG/1
5 TABLET ORAL
Refills: 0 | Status: DISCONTINUED | OUTPATIENT
Start: 2023-01-19 | End: 2023-01-21

## 2023-01-19 RX ADMIN — Medication 30 MILLIGRAM(S): at 09:32

## 2023-01-19 RX ADMIN — Medication 975 MILLIGRAM(S): at 18:30

## 2023-01-19 RX ADMIN — HEPARIN SODIUM 5000 UNIT(S): 5000 INJECTION INTRAVENOUS; SUBCUTANEOUS at 08:00

## 2023-01-19 RX ADMIN — Medication 975 MILLIGRAM(S): at 12:55

## 2023-01-19 RX ADMIN — HEPARIN SODIUM 5000 UNIT(S): 5000 INJECTION INTRAVENOUS; SUBCUTANEOUS at 22:54

## 2023-01-19 RX ADMIN — Medication 975 MILLIGRAM(S): at 00:45

## 2023-01-19 RX ADMIN — SIMETHICONE 80 MILLIGRAM(S): 80 TABLET, CHEWABLE ORAL at 06:49

## 2023-01-19 RX ADMIN — Medication 600 MILLIGRAM(S): at 15:10

## 2023-01-19 RX ADMIN — Medication 30 MILLIGRAM(S): at 10:30

## 2023-01-19 RX ADMIN — Medication 600 MILLIGRAM(S): at 21:53

## 2023-01-19 RX ADMIN — Medication 30 MILLIGRAM(S): at 03:45

## 2023-01-19 RX ADMIN — Medication 975 MILLIGRAM(S): at 00:15

## 2023-01-19 RX ADMIN — OXYCODONE HYDROCHLORIDE 5 MILLIGRAM(S): 5 TABLET ORAL at 19:10

## 2023-01-19 RX ADMIN — Medication 30 MILLIGRAM(S): at 04:15

## 2023-01-19 RX ADMIN — Medication 975 MILLIGRAM(S): at 06:06

## 2023-01-19 RX ADMIN — OXYCODONE HYDROCHLORIDE 5 MILLIGRAM(S): 5 TABLET ORAL at 18:30

## 2023-01-19 RX ADMIN — Medication 975 MILLIGRAM(S): at 19:10

## 2023-01-19 RX ADMIN — Medication 600 MILLIGRAM(S): at 20:18

## 2023-01-19 RX ADMIN — Medication 975 MILLIGRAM(S): at 12:20

## 2023-01-19 RX ADMIN — Medication 600 MILLIGRAM(S): at 15:55

## 2023-01-19 RX ADMIN — Medication 975 MILLIGRAM(S): at 06:35

## 2023-01-20 RX ADMIN — Medication 975 MILLIGRAM(S): at 05:26

## 2023-01-20 RX ADMIN — HEPARIN SODIUM 5000 UNIT(S): 5000 INJECTION INTRAVENOUS; SUBCUTANEOUS at 21:07

## 2023-01-20 RX ADMIN — Medication 600 MILLIGRAM(S): at 11:00

## 2023-01-20 RX ADMIN — Medication 600 MILLIGRAM(S): at 10:07

## 2023-01-20 RX ADMIN — Medication 975 MILLIGRAM(S): at 00:19

## 2023-01-20 RX ADMIN — Medication 975 MILLIGRAM(S): at 01:28

## 2023-01-20 RX ADMIN — OXYCODONE HYDROCHLORIDE 5 MILLIGRAM(S): 5 TABLET ORAL at 11:00

## 2023-01-20 RX ADMIN — HEPARIN SODIUM 5000 UNIT(S): 5000 INJECTION INTRAVENOUS; SUBCUTANEOUS at 10:06

## 2023-01-20 RX ADMIN — SIMETHICONE 80 MILLIGRAM(S): 80 TABLET, CHEWABLE ORAL at 16:42

## 2023-01-20 RX ADMIN — OXYCODONE HYDROCHLORIDE 5 MILLIGRAM(S): 5 TABLET ORAL at 10:07

## 2023-01-20 RX ADMIN — OXYCODONE HYDROCHLORIDE 5 MILLIGRAM(S): 5 TABLET ORAL at 05:55

## 2023-01-20 RX ADMIN — OXYCODONE HYDROCHLORIDE 5 MILLIGRAM(S): 5 TABLET ORAL at 05:31

## 2023-01-20 RX ADMIN — Medication 975 MILLIGRAM(S): at 05:55

## 2023-01-20 RX ADMIN — SIMETHICONE 80 MILLIGRAM(S): 80 TABLET, CHEWABLE ORAL at 10:49

## 2023-01-20 RX ADMIN — Medication 600 MILLIGRAM(S): at 21:07

## 2023-01-20 RX ADMIN — Medication 975 MILLIGRAM(S): at 13:17

## 2023-01-20 RX ADMIN — Medication 600 MILLIGRAM(S): at 17:30

## 2023-01-20 RX ADMIN — SIMETHICONE 80 MILLIGRAM(S): 80 TABLET, CHEWABLE ORAL at 05:26

## 2023-01-20 RX ADMIN — Medication 600 MILLIGRAM(S): at 22:07

## 2023-01-20 RX ADMIN — Medication 600 MILLIGRAM(S): at 02:54

## 2023-01-20 RX ADMIN — Medication 975 MILLIGRAM(S): at 14:15

## 2023-01-20 RX ADMIN — Medication 600 MILLIGRAM(S): at 16:42

## 2023-01-21 ENCOUNTER — TRANSCRIPTION ENCOUNTER (OUTPATIENT)
Age: 33
End: 2023-01-21

## 2023-01-21 VITALS
DIASTOLIC BLOOD PRESSURE: 76 MMHG | HEART RATE: 73 BPM | TEMPERATURE: 99 F | RESPIRATION RATE: 18 BRPM | SYSTOLIC BLOOD PRESSURE: 113 MMHG | OXYGEN SATURATION: 98 %

## 2023-01-21 PROCEDURE — 85384 FIBRINOGEN ACTIVITY: CPT

## 2023-01-21 PROCEDURE — 86901 BLOOD TYPING SEROLOGIC RH(D): CPT

## 2023-01-21 PROCEDURE — 85610 PROTHROMBIN TIME: CPT

## 2023-01-21 PROCEDURE — U0003: CPT

## 2023-01-21 PROCEDURE — 85730 THROMBOPLASTIN TIME PARTIAL: CPT

## 2023-01-21 PROCEDURE — 85025 COMPLETE CBC W/AUTO DIFF WBC: CPT

## 2023-01-21 PROCEDURE — U0005: CPT

## 2023-01-21 PROCEDURE — 86780 TREPONEMA PALLIDUM: CPT

## 2023-01-21 PROCEDURE — 36415 COLL VENOUS BLD VENIPUNCTURE: CPT

## 2023-01-21 PROCEDURE — 86922 COMPATIBILITY TEST ANTIGLOB: CPT

## 2023-01-21 PROCEDURE — 86850 RBC ANTIBODY SCREEN: CPT

## 2023-01-21 PROCEDURE — 59050 FETAL MONITOR W/REPORT: CPT

## 2023-01-21 PROCEDURE — 86900 BLOOD TYPING SEROLOGIC ABO: CPT

## 2023-01-21 PROCEDURE — 85027 COMPLETE CBC AUTOMATED: CPT

## 2023-01-21 PROCEDURE — 59025 FETAL NON-STRESS TEST: CPT

## 2023-01-21 PROCEDURE — 86880 COOMBS TEST DIRECT: CPT

## 2023-01-21 PROCEDURE — 86870 RBC ANTIBODY IDENTIFICATION: CPT

## 2023-01-21 PROCEDURE — 86769 SARS-COV-2 COVID-19 ANTIBODY: CPT

## 2023-01-21 RX ORDER — ACETAMINOPHEN 500 MG
1 TABLET ORAL
Qty: 0 | Refills: 0 | DISCHARGE

## 2023-01-21 RX ORDER — ALBUTEROL 90 UG/1
2 AEROSOL, METERED ORAL
Qty: 0 | Refills: 0 | DISCHARGE

## 2023-01-21 RX ORDER — FLUTICASONE PROPIONATE AND SALMETEROL 50; 250 UG/1; UG/1
0 POWDER ORAL; RESPIRATORY (INHALATION)
Qty: 0 | Refills: 0 | DISCHARGE

## 2023-01-21 RX ORDER — IBUPROFEN 200 MG
3 TABLET ORAL
Qty: 0 | Refills: 0 | DISCHARGE
Start: 2023-01-21

## 2023-01-21 RX ORDER — ACETAMINOPHEN 500 MG
2 TABLET ORAL
Qty: 0 | Refills: 0 | DISCHARGE
Start: 2023-01-21

## 2023-01-21 RX ADMIN — Medication 975 MILLIGRAM(S): at 12:53

## 2023-01-21 RX ADMIN — Medication 975 MILLIGRAM(S): at 13:26

## 2023-01-21 RX ADMIN — Medication 975 MILLIGRAM(S): at 02:10

## 2023-01-21 RX ADMIN — Medication 600 MILLIGRAM(S): at 09:03

## 2023-01-21 RX ADMIN — Medication 975 MILLIGRAM(S): at 06:27

## 2023-01-21 RX ADMIN — Medication 975 MILLIGRAM(S): at 05:27

## 2023-01-21 RX ADMIN — Medication 600 MILLIGRAM(S): at 09:33

## 2023-01-21 RX ADMIN — HEPARIN SODIUM 5000 UNIT(S): 5000 INJECTION INTRAVENOUS; SUBCUTANEOUS at 09:03

## 2023-01-21 RX ADMIN — Medication 975 MILLIGRAM(S): at 01:10

## 2023-01-21 NOTE — DISCHARGE NOTE OB - CARE PLAN
Principal Discharge DX:	 delivery delivered  Assessment and plan of treatment:	office in 2 weeks  call if heavy vaginal bleeding or severe head cache, chest pain   1

## 2023-01-21 NOTE — PROGRESS NOTE ADULT - ASSESSMENT
A/P:  32y  POD # 1 S/P  repeat   section for breech presentation, category 2 FHT EBL 1200ml  Doing well    PMHx:  Current Issues: none
A/P:  32y       S/P  repeat  section    POD # 2, doing well      Current Issues: none  PAST MEDICAL & SURGICAL HISTORY:  Asthma  deneis any recent exacerbation      History of eczema      History of  section  2016      H/O foot surgery  left leg- childhood      History of colonoscopy      One previous induced termination of pregnancy      History of D&amp;C
A/P:  32y       S/P  repeat  section    POD #3, , doing well      Current Issues: none  PAST MEDICAL & SURGICAL HISTORY:  Asthma  deneis any recent exacerbation      History of eczema      History of  section        H/O foot surgery  left leg- childhood      History of colonoscopy      One previous induced termination of pregnancy      History of D&amp;C

## 2023-01-21 NOTE — DISCHARGE NOTE OB - MATERIALS PROVIDED
[facility] Hiram Screening Program/Hiram  Immunization Record/Breastfeeding Log/Breastfeeding Mother’s Support Group Information/Guide to Postpartum Care/[facility] Hearing Screen Program/Back To Sleep Handout/Shaken Baby Prevention Handout/Breastfeeding Guide and Packet/Birth Certificate Instructions/Discharge Medication Information for Patients and Families Pocket Guide

## 2023-01-21 NOTE — PROGRESS NOTE ADULT - ATTENDING COMMENTS
Patient doing well, no complaints, out of bed.   No HA, CP, SOB  No heavy vaginal bleeding (VB)/normal lochia    ICU Vital Signs Last 24 Hrs  T(C): 37.1 (21 Jan 2023 13:15), Max: 37.1 (21 Jan 2023 13:15)  T(F): 98.7 (21 Jan 2023 13:15), Max: 98.7 (21 Jan 2023 13:15)  HR: 73 (21 Jan 2023 13:15) (64 - 80)  BP: 113/76 (21 Jan 2023 13:15) (105/69 - 113/76)  RR: 18 (21 Jan 2023 13:15) (18 - 18)  SpO2: 98% (21 Jan 2023 13:15) (96% - 98%)    O2 Parameters below as of 21 Jan 2023 13:15  Patient On (Oxygen Delivery Method): room air    NAD, walking in room  Abd soft, nondistended, inc: clean dry intact  Ext nontender    POD # 3 stable for discharge after C/S @ 37 wks for fetal heart rate  Patient seen and evaluated by me. I agree with above note unless otherwise stated.   Routine postpartum care, regular diet as tolerated, ambulate and pain control as needed.     KITTY Paige MD  Attending

## 2023-01-21 NOTE — DISCHARGE NOTE OB - PATIENT PORTAL LINK FT
You can access the FollowMyHealth Patient Portal offered by [facility] by registering at the following website: [email or web address]. By joining [employer]’s FollowMyHealth portal, you will also be able to view your health information using other applications (apps) compatible with our system.

## 2023-01-21 NOTE — DISCHARGE NOTE OB - MEDICATION SUMMARY - MEDICATIONS TO STOP TAKING
I will STOP taking the medications listed below when I get home from the hospital:    Tylenol 325 mg oral tablet  -- 1  by mouth 4 times a day

## 2023-01-21 NOTE — DISCHARGE NOTE OB - CARE PROVIDER_API CALL
Demetrio Woodward)  Obstetrics and Gynecology  [address]  [place] [address]  Phone: [phone]  Fax: [phone]  Follow Up Time:

## 2023-01-21 NOTE — DISCHARGE NOTE OB - MEDICATION SUMMARY - MEDICATIONS TO TAKE
I will START or STAY ON the medications listed below when I get home from the hospital:    Acetaminophen Extra Strength Gelcaps 500 mg  -- 2 tab(s) by mouth every 6 hours  -- Indication: For  delivery delivered    Motrin  mg oral tablet  -- 3  by mouth 4 times a day  -- Indication: For  delivery delivered   Epidermal Closure: simple interrupted

## 2023-01-21 NOTE — PROGRESS NOTE ADULT - PROBLEM SELECTOR PLAN 1
Increase OOB  PO Pain Protocol  Continue Regular Diet  DVT ppx  Continue Routine Postop/Postpartum Care
Increase OOB  PO Pain Protocol  Continue Regular Diet  Continue Routine Postop/Postpartum Care      Tonia Golden PA-C
Increase OOB  PO Pain Protocol  DVT ppx  Dressing removed  Regular diet  AM CBC  Routine Postpartum/Post-op care

## 2023-01-21 NOTE — DISCHARGE NOTE OB - HOSPITAL COURSE
repeat  in setting of abnormal fetal heart rate at 37 weeks  uncomplicated  postpartum course  stable for discharge home

## 2023-01-21 NOTE — DISCHARGE NOTE OB - NS MD DC FALL RISK RISK
For information on Fall & Injury Prevention, visit: [email or web address] OR  [email or web address] OR  https://www.cdc.gov/steadi/patient.html

## 2023-01-21 NOTE — DISCHARGE NOTE OB - EXPOSE INCISION TO AIR AS MUCH AS POSSIBLE
Exam:  Two views of the chest. 

  

Comparison:  05/17/2018. 

  

Reason for exam:  Cough. 

  

FINDINGS:  Patchy airspace opacities are seen in the left lower lobe.  No pneumothorax or pleural eff
usion.  The cardiac silhouette is not enlarged. 

  

Impression: 

  

Patchy airspace opacities in the left lower lobe likely atelectasis or developing pneumonia.
Statement Selected

## 2023-01-21 NOTE — PROGRESS NOTE ADULT - SUBJECTIVE AND OBJECTIVE BOX
Day 1 of Anesthesia Pain Management Service    SUBJECTIVE:  Pain Scale Score:          [X] Refer to charted pain scores    THERAPY:    s/p neuraxial PF morphine    MEDICATIONS  (STANDING):  acetaminophen     Tablet .. 975 milliGRAM(s) Oral every 6 hours  citric acid/sodium citrate Solution 15 milliLiter(s) Oral once  diphtheria/tetanus/pertussis (acellular) Vaccine (Adacel) 0.5 milliLiter(s) IntraMuscular once  heparin   Injectable 5000 Unit(s) SubCutaneous every 12 hours  ketorolac   Injectable 30 milliGRAM(s) IV Push every 6 hours  lactated ringers. 1000 milliLiter(s) (125 mL/Hr) IV Continuous <Continuous>  morphine PF Spinal 0.1 milliGRAM(s) IntraThecal. once  oxytocin Infusion 333.333 milliUNIT(s)/Min (1000 mL/Hr) IV Continuous <Continuous>    MEDICATIONS  (PRN):  dexAMETHasone  Injectable 4 milliGRAM(s) IV Push every 6 hours PRN Nausea  diphenhydrAMINE 25 milliGRAM(s) Oral every 6 hours PRN Pruritus  lanolin Ointment 1 Application(s) Topical every 6 hours PRN Sore Nipples  magnesium hydroxide Suspension 30 milliLiter(s) Oral two times a day PRN Constipation  nalbuphine Injectable 2.5 milliGRAM(s) IV Push every 6 hours PRN Pruritus  naloxone Injectable 0.1 milliGRAM(s) IV Push every 3 minutes PRN For ANY of the following changes in patient status:  A. Breaths Per Minute LESS THAN 10, B. Oxygen saturation LESS THAN 90%, C. Sedation score of 6 for Stop After: 4 Times  ondansetron Injectable 4 milliGRAM(s) IV Push every 6 hours PRN Nausea  oxyCODONE    IR 5 milliGRAM(s) Oral every 3 hours PRN Moderate to Severe Pain (4-10)  oxyCODONE    IR 5 milliGRAM(s) Oral once PRN Moderate to Severe Pain (4-10)  simethicone 80 milliGRAM(s) Chew every 4 hours PRN Gas      OBJECTIVE:    Sedation:        	[X] Alert	[ ] Drowsy	[ ] Arousable      [ ] Asleep       [ ] Unresponsive    Side Effects:	[X] None	[ ] Nausea	[ ] Vomiting         [ ] Pruritus  		[ ] Weakness            [ ] Numbness	          [ ] Other:    Vital Signs Last 24 Hrs  T(C): 36.9 (19 Jan 2023 06:43), Max: 36.9 (19 Jan 2023 06:43)  T(F): 98.4 (19 Jan 2023 06:43), Max: 98.4 (19 Jan 2023 06:43)  HR: 76 (19 Jan 2023 06:43) (62 - 125)  BP: 92/59 (19 Jan 2023 06:43) (88/57 - 129/73)  BP(mean): 70 (18 Jan 2023 16:15) (67 - 79)  RR: 18 (19 Jan 2023 06:43) (12 - 22)  SpO2: 95% (19 Jan 2023 06:43) (95% - 100%)    Parameters below as of 19 Jan 2023 06:43  Patient On (Oxygen Delivery Method): room air        ASSESSMENT/ PLAN  [X] Patient transitioned to prn analgesics  [X] Pain management per primary service, pain service to sign off   [X]Documentation and Verification of current medications
Postpartum Note,  Section   ATTENDING NOTE - Post-operative day 1  PAUL ENCISO  [id number]  32y      Patient is a 32y old  Female S/P C/S POD#1    Subjective:  The patient feels well. Ambulating without difficulty  Pt is tolerating regular diet. Pain controlled.  She denies nausea and vomiting.  She reports normal postpartum bleeding  +flatus    Physical exam:    Vital Signs Last 24 Hrs  T(C): 36.9 (2023 06:43), Max: 36.9 (2023 06:43)  T(F): 98.4 (2023 06:43), Max: 98.4 (2023 06:43)  HR: 76 (2023 06:43) (62 - 125)  BP: 92/59 (2023 06:43) (88/57 - 129/73)  BP(mean): 70 (2023 16:15) (67 - 79)  RR: 18 (2023 06:43) (12 - 22)  SpO2: 95% (2023 06:43) (95% - 100%)    Parameters below as of 2023 06:43  Patient On (Oxygen Delivery Method): room air        23 @ 07:01  -  23 @ 07:00  --------------------------------------------------------  IN: 3100 mL / OUT: 2820 mL / NET: 280 mL      Physical Exam   Gen: NAD  Abdomen: Soft, nontender, no distension , firm uterine fundus at umbilicus.  Incision: Clean, dry, and intact  Pelvic: Normal lochia noted  Ext: No calf tenderness b/l, noc/c/e      LABS:                        10.1   16.75 )-----------( 269      ( 2023 06:51 )             30.1                         12.2   18.15 )-----------( 269      ( 2023 16:47 )             36.1                         12.5   12.38 )-----------( 309      ( 2023 21:49 )             36.7                   Allergies    fish (Anaphylaxis)  peanuts (Anaphylaxis)  penicillin (Unknown)  shellfish (Anaphylaxis)  Tree Nuts (Anaphylaxis)    Intolerances      MEDICATIONS  (STANDING):  acetaminophen     Tablet .. 975 milliGRAM(s) Oral every 6 hours  citric acid/sodium citrate Solution 15 milliLiter(s) Oral once  diphtheria/tetanus/pertussis (acellular) Vaccine (Adacel) 0.5 milliLiter(s) IntraMuscular once  heparin   Injectable 5000 Unit(s) SubCutaneous every 12 hours  ketorolac   Injectable 30 milliGRAM(s) IV Push every 6 hours  lactated ringers. 1000 milliLiter(s) (125 mL/Hr) IV Continuous <Continuous>  morphine PF Spinal 0.1 milliGRAM(s) IntraThecal. once  oxytocin Infusion 333.333 milliUNIT(s)/Min (1000 mL/Hr) IV Continuous <Continuous>    MEDICATIONS  (PRN):  dexAMETHasone  Injectable 4 milliGRAM(s) IV Push every 6 hours PRN Nausea  diphenhydrAMINE 25 milliGRAM(s) Oral every 6 hours PRN Pruritus  lanolin Ointment 1 Application(s) Topical every 6 hours PRN Sore Nipples  magnesium hydroxide Suspension 30 milliLiter(s) Oral two times a day PRN Constipation  nalbuphine Injectable 2.5 milliGRAM(s) IV Push every 6 hours PRN Pruritus  naloxone Injectable 0.1 milliGRAM(s) IV Push every 3 minutes PRN For ANY of the following changes in patient status:  A. Breaths Per Minute LESS THAN 10, B. Oxygen saturation LESS THAN 90%, C. Sedation score of 6 for Stop After: 4 Times  ondansetron Injectable 4 milliGRAM(s) IV Push every 6 hours PRN Nausea  oxyCODONE    IR 5 milliGRAM(s) Oral every 3 hours PRN Moderate to Severe Pain (4-10)  oxyCODONE    IR 5 milliGRAM(s) Oral once PRN Moderate to Severe Pain (4-10)  simethicone 80 milliGRAM(s) Chew every 4 hours PRN Gas        Assessment and Plan:  POD # 1  s/p  section. Patient stable.  Encourage ambulation  Continue with PCEA/PCA  Regular diet as tolerated  Follow up CBC    Honey Adams MD  Office Number [phone]      
Postpartum Note-  Section POD#2    Allergies    fish (Anaphylaxis)  peanuts (Anaphylaxis)  penicillin (Unknown)  shellfish (Anaphylaxis)  Tree Nuts (Anaphylaxis)    Intolerances    Blood type: O  Positive    RPR: Negative    Rubella: Immune    S: Patient is a  33yo    P    POD#2 S/P C/Sec  Subjective: Patient w/o complaints, pain is controlled.  Pt is OOB, tolerating PO, denies passing flatus, and  + voiding. Lochia WNL.     Feeding: Breast    O:  Vital Signs Last 24 Hrs  T(C): 36.8 (2023 05:00), Max: 36.9 (2023 17:01)  T(F): 98.3 (2023 05:00), Max: 98.5 (2023 17:01)  HR: 67 (2023 05:00) (67 - 77)  BP: 105/70 (2023 05:00) (93/60 - 105/70)  RR: 18 (2023 05:00) (18 - 18)  SpO2: 97% (2023 05:00) (95% - 97%)    Gen: NAD  Abdomen: +BS, Soft, nontender, non distended, fundus firm.  Incision: Clean, dry, and intact.  Negative erythema/edema/ecchymosis.   SubQ/dermabond  Lochia WNL  Ext: Neg calf tenderness    LABS:                          10.1   16.75 )-----------( 269      ( 2023 06:51 )             30.1             
Postpartum Note-  Section POD#3    Allergies    fish (Anaphylaxis)  peanuts (Anaphylaxis)  penicillin (Unknown)  shellfish (Anaphylaxis)  Tree Nuts (Anaphylaxis)    Intolerances    Blood type: O  Positive    RPR: Negative    Rubella: Immune    S:  Subjective: Patient w/o complaints, pain is controlled.  Pt is OOB, tolerating PO, denies passing flatus, and  + voiding. Lochia WNL.   Pt denies h/a, dizziness, SOB or CP    Feeding: Breast    O:  ICU Vital Signs Last 24 Hrs  T(C): 36.8 (2023 05:20), Max: 37.3 (2023 13:45)  T(F): 98.3 (2023 05:20), Max: 99.1 (2023 13:45)  HR: 64 (2023 05:20) (64 - 80)  BP: 105/69 (2023 05:20) (96/62 - 107/68)  BP(mean): --  ABP: --  ABP(mean): --  RR: 18 (2023 05:20) (18 - 18)  SpO2: 96% (2023 05:20) (96% - 98%)    O2 Parameters below as of 2023 05:20  Patient On (Oxygen Delivery Method): room air        Gen: NAD  Abdomen: Soft, nontender, non distended, fundus firm.  Incision: Clean, dry, and intact.  Negative erythema/edema/ecchymosis.   SubQ/dermabond  Lochia WNL  Ext: Neg calf tenderness    LABS:                          10.1   16.75 )-----------( 269      ( 2023 06:51 )             30.1             
Postpartum Note-  Section POD#1    Prenatal Labs  Blood type: O Positive  Rubella IgG:   RPR: Negative          S:Patient w/o complaints, pain is controlled.  Pt is OOB, tolerating PO, not passing flatus. Voiding. Mark JOSEPH.     O:  Vital Signs Last 24 Hrs  T(C): 36.9 (2023 06:43), Max: 37.1 (2023 09:16)  T(F): 98.4 (2023 06:43), Max: 98.78 (2023 09:16)  HR: 76 (2023 06:43) (62 - 125)  BP: 92/59 (2023 06:43) (88/57 - 129/73)  BP(mean): 70 (2023 16:15) (67 - 79)  RR: 18 (2023 06:43) (12 - 22)  SpO2: 95% (2023 06:43) (92% - 100%)    Parameters below as of 2023 06:43  Patient On (Oxygen Delivery Method): room air      I&O's Summary    2023 07:01  -  2023 07:00  --------------------------------------------------------  IN: 3100 mL / OUT: 2820 mL / NET: 280 mL        Gen: NAD  Abdomen: Softly distended, appropriate tenderness, fundus firm.  Incision: Clean/dry/ intact. no erythema, no ecchymosis   Sub Q  Dermabond  Mark JOSEPH  Ext:Nontender    LABS:             10.1   16.75 )-----------( 269      (  @ 06:51 )             30.1                12.2   18.15 )-----------( 269      (  @ 16:47 )             36.1                12.5   12.38 )-----------( 309      (  @ 21:49 )             36.7

## 2023-01-31 ENCOUNTER — APPOINTMENT (OUTPATIENT)
Dept: OBGYN | Facility: CLINIC | Age: 33
End: 2023-01-31

## 2023-02-06 ENCOUNTER — APPOINTMENT (OUTPATIENT)
Dept: OBGYN | Facility: CLINIC | Age: 33
End: 2023-02-06
Payer: COMMERCIAL

## 2023-02-06 VITALS — WEIGHT: 146 LBS | SYSTOLIC BLOOD PRESSURE: 123 MMHG | DIASTOLIC BLOOD PRESSURE: 82 MMHG | BODY MASS INDEX: 24.3 KG/M2

## 2023-02-06 PROCEDURE — 0503F POSTPARTUM CARE VISIT: CPT

## 2023-02-06 NOTE — HISTORY OF PRESENT ILLNESS
[Postpartum Follow Up] : postpartum follow up [Primary C/S] : delivered by  section [Pertussis Vaccine] : Pertussis vaccine administered [Breastfeeding] : currently nursing [Intended Contraception] : Intended Contraception: [IUD] : intrauterine device [Clean/Dry/Intact] : clean, dry and intact [Healed] : healed [Mild] : mild vaginal bleeding [Normal] : the vagina was normal [Not Done] : Examination of breasts not done [Doing Well] : is doing well [No Sign of Infection] : is showing no signs of infection [Excellent Pain Control] : has excellent pain control [None] : None [Complications:___] : no complications [Rhogam] : Rhogam was not administered [Rubella Vaccine] : Rubella vaccine was not administered [BTL] : no tubal ligation [BF with Difficulty] : nursing without difficulty [Resumed Menses] : has not resumed her menses [Resumed [place]] : has not resumed intercourse [S/Sx PP Depression] : no signs/symptoms of postpartum depression [Erythema] : not erythematous [Swelling] : not swollen [Dehiscence] : not dehisced [Cervix Sample Taken] : cervical sample not taken for a Pap smear [FreeTextEntry8] : s/p repeat C/s (breech/spont decels)  1/18 Cedric Nichols Jr 6-6; breastfeeding, no complaints re wound; lochia mild, moods OK

## 2023-03-03 ENCOUNTER — APPOINTMENT (OUTPATIENT)
Dept: OBGYN | Facility: CLINIC | Age: 33
End: 2023-03-03
Payer: COMMERCIAL

## 2023-03-03 VITALS — DIASTOLIC BLOOD PRESSURE: 85 MMHG | SYSTOLIC BLOOD PRESSURE: 122 MMHG

## 2023-03-03 DIAGNOSIS — Z30.430 ENCOUNTER FOR INSERTION OF INTRAUTERINE CONTRACEPTIVE DEVICE: ICD-10-CM

## 2023-03-03 LAB
HCG UR QL: NEGATIVE
QUALITY CONTROL: YES

## 2023-03-03 PROCEDURE — 58300 INSERT INTRAUTERINE DEVICE: CPT

## 2023-03-03 PROCEDURE — 81025 URINE PREGNANCY TEST: CPT

## 2023-03-03 NOTE — PROCEDURE
[IUD Placement] : intrauterine device (IUD) placement [Time out performed] : Pre-procedure time out performed.  Patient's name, date of birth and procedure confirmed. [Consent Obtained] : Consent obtained [Risks] : risks [Benefits] : benefits [Alternatives] : alternatives [Patient] : patient [Infection] : infection [Bleeding] : bleeding [Pain] : pain [Expulsion] : expulsion [Failure] : failure [Uterine Perforation] : uterine perforation [Neg Pregnancy Test] : negative pregnancy test [No Premedication] : No premedication [Betadine] : Betadine [Easy Passage] : Easy passage [Tolerated Well] : Patient tolerated the procedure well [No Complications] : No complications [de-identified] : [id number] [de-identified] : 3/2025 [de-identified] : 3/3/2031

## 2023-03-06 LAB
C TRACH RRNA SPEC QL NAA+PROBE: NOT DETECTED
N GONORRHOEA RRNA SPEC QL NAA+PROBE: NOT DETECTED
SOURCE AMPLIFICATION: NORMAL

## 2023-05-12 ENCOUNTER — APPOINTMENT (OUTPATIENT)
Dept: OBGYN | Facility: CLINIC | Age: 33
End: 2023-05-12
Payer: COMMERCIAL

## 2023-05-12 VITALS — SYSTOLIC BLOOD PRESSURE: 120 MMHG | DIASTOLIC BLOOD PRESSURE: 84 MMHG

## 2023-05-12 DIAGNOSIS — T83.9XXA UNSPECIFIED COMPLICATION OF GENITOURINARY PROSTHETIC DEVICE, IMPLANT AND GRAFT, INITIAL ENCOUNTER: ICD-10-CM

## 2023-05-12 PROCEDURE — 99213 OFFICE O/P EST LOW 20 MIN: CPT

## 2023-05-12 NOTE — PHYSICAL EXAM
[Chaperone Present] : A chaperone was present in the examining room during all aspects of the physical examination [Normal] : normal [IUD String] : an IUD string was noted [FreeTextEntry5] : String trimmed so it is at level of os

## 2023-05-12 NOTE — HISTORY OF PRESENT ILLNESS
[FreeTextEntry1] : 34YO P2 s/p  2023 s/p Mirena IUD placement in March, stopped bleeding 2 weeks ago but  gets sharp pain from the string when they have sex.

## 2023-08-12 NOTE — OB PROVIDER TRIAGE NOTE - NS_TRIAGEEVALUATION_OBGYN_ALL_OB_DT
[facility] Hospitalist Progress Note      Subjective  Patient seen and examined  Sleepy-opened eye to nociception  IDRIS 40. 98% 4L O2NC  NE 1mcg  Bumex drip 21mg/hr.   Dialysis cath clogged up. CRRT UF 0-50ml/hr. I&O net: -246 ml last 24h (+11,188K since admission)  LVAD/RVAD  Chest tube in place  WBC 16K. INR 1.0    Physical Exam    Vitals with min/max:    Vital Last Value 24 Hour Range   Temperature 96.8 °F (36 °C) (08/12/23 0400) Temp  Min: 96.8 °F (36 °C)  Max: 97.9 °F (36.6 °C)   Pulse 60 (08/12/23 0700) Pulse  Min: 59  Max: 69   Respiratory 12 (08/12/23 0900) Resp  Min: 10  Max: 21   Non-Invasive  Blood Pressure 95/59 (08/11/23 0825) No data recorded   Pulse Oximetry 94 % (08/12/23 1200) SpO2  Min: 94 %  Max: 100 %   Arterial   Blood Pressure (!) 87/60 (08/12/23 0700) Arterial Line BP  Min: 77/49  Max: 105/70      Body mass index is 24.27 kg/m².      I/O's:    Intake/Output Summary (Last 24 hours) at 8/12/2023 1257  Last data filed at 8/12/2023 0659  Gross per 24 hour   Intake 1860.24 ml   Output 2107 ml   Net -246.76 ml       General: patient not in acute distress.  HEENT: Normocephalic. Normal conjunctiva. Mild whitish tongue coating. Pharynx wnl  Respiratory: chest expansion wnl. Diminsihes BS on bases  Chest tube in place  Cardiovascular: Regular rate and rhythm.  LEs  peripheral edema. Slight discoloration-mottling/maceration toes/feet  VAD DLs C/D/I  Gastrointestinal: Abdomen soft, non tender, non distended. Bowel sound present in all 4 quadrants.  Genitourinary: No suprapubic fullness.   Musculoskeletal: Moves all extremities  Neurology: sleepy  Skin: Warm.   Psychiatry: Calm      Current Medications:  Current Facility-Administered Medications   Medication Dose Route Frequency Provider Last Rate Last Admin   • warfarin (COUMADIN) tablet 2 mg  2 mg Oral Once Lucien Villalobos MD       • albumin human (SPA) 25 % injection 25 g  25 g Intravenous 3 times per day Jeannie De Los Santos MD       • potassium  phosphate 30 mmol in sodium chloride 0.9 % 250 mL IVPB  30 mmol Intravenous Once Mallory Dior MD       • sodium chloride 0.9% infusion   Intravenous Continuous PRN Lucien Villalobos MD       • WARFARIN - PHYSICIAN MONITORED 1 each  1 each Does not apply Q Evening Lucien Villalobos MD   1 each at 08/11/23 1822   • thiamine (VITAMIN B1) tablet 100 mg  100 mg Oral Daily Josseline Licona DO   100 mg at 08/11/23 1809   • acetaminophen (TYLENOL) tablet 650 mg  650 mg Per NG Tube Q6H PRN Anusha Jones CNP   650 mg at 08/10/23 0238    Or   • acetaminophen (TYLENOL) suppository 975 mg  975 mg Rectal Q6H PRN Anusha Jones CNP       • HYDROcodone-acetaminophen (NORCO)  MG per tablet 1 tablet  1 tablet Oral Q6H PRN David Espinal MD   1 tablet at 08/12/23 0430    Or   • oxyCODONE-acetaminophen (PERCOCET)  MG tablet 1 tablet  1 tablet Oral Q4H PRN David Espinal MD       • heparin (porcine) 25,000 units/250 mL in dextrose 5 % infusion  500 Units/hr Dialysis Continuous Mlalory Dior MD   Completed at 08/12/23 0558   • haloperidol lactate (HALDOL) 5 MG/ML short-acting injection 2 mg  2 mg Intravenous Q6H PRN Gideon Ho MD       • sodium chloride 0.9% infusion   Intravenous Continuous PRN Julius Cary MD       • QUEtiapine (SEROquel) tablet 25 mg  25 mg Oral Nightly Jackie Matos MD   25 mg at 08/11/23 2003   • bumetanide (BUMEX) 12.5 mg/50 mL infusion adult or ped > 15 kg (0.25 mg/mL)  1 mg/hr Intravenous Continuous Dewayne Luo MD 4 mL/hr at 08/12/23 0659 1 mg/hr at 08/12/23 0659   • sodium chloride 0.9% infusion   Intravenous Continuous PRN David Espinal MD       • sodium chloride 0.9% infusion   Intravenous Continuous PRN Jannette Negro MD       • NORepinephrine (LEVOPHED) 8 mg/250 mL in dextrose 5 % infusion  0-100 mcg/min Intravenous Continuous CaryJulius MD 1.88 mL/hr at 08/12/23 0441 1 mcg/min at 08/12/23 0441   • alteplase  (CATHFLO ACTIVASE) injection 2 mg  2 mg Intracatheter PRN Laura Krueger MD   2 mg at 08/09/23 0445   • pantoprazole (PROTONIX) 40 MG/20ML (compounded) suspension 40 mg  40 mg Per NG Tube Nightly Lucien Villalobos MD   40 mg at 08/11/23 2003   • sodium chloride 0.9% infusion   Intravenous Continuous PRN David Espinal MD       • potassium phosphate 20 mmol in sodium chloride 0.9 % 250 mL IVPB  20 mmol Intravenous Q12H PRN Laura Krueger MD   Completed at 08/11/23 2049    Or   • sodium PHOSPHATE 20 mmol in sodium chloride 0.9 % 250 mL IVPB  20 mmol Intravenous Q12H PRN Laura Krueger MD   Completed at 08/10/23 1650   • magnesium sulfate 2 g in 50 mL premix IVPB  2 g Intravenous Q12H PRN Laura Krueger MD       • potassium CHLORIDE 20 MEQ/50ML IVPB premix 20 mEq  20 mEq Intravenous PRN Laura Krueger MD       • potassium CHLORIDE 20 MEQ/50ML IVPB premix 20 mEq  20 mEq Intravenous PRN Laura Krueger MD   Completed at 08/12/23 0129   • sodium chloride (PF) 0.9 % injection 10 mL  10 mL Intracatheter PRN Laura Krueger MD       • sodium chloride (NORMAL SALINE) 0.9 % bolus 1,000 mL  1,000 mL CRRT PRN Laura Krueger MD       • calcium gluconate 1 g in sodium chloride 50 mL IVPB  1 g Intravenous PRN Laura Krueger MD       • Standard Replacement Fluid with Calcium, Potassium Chloride 4 mEq/L (PRISMASOL BGK 4/2.5)   CRRT Continuous Jeannie De Los Santos MD 1,000 mL/hr at 08/11/23 2044 Rate Change at 08/11/23 2044   • insulin lispro (ADMELOG,HumaLOG) - Correction Dose   Subcutaneous 4 times per day Khanh Fiore MD   1 Units at 08/08/23 0010   • dextrose (GLUTOSE) 40 % gel 15 g  15 g Per NG Tube PRN Lucien Villalobos MD       • dextrose (GLUTOSE) 40 % gel 30 g  30 g Per NG Tube PRN Lucien Villalobos MD       • atorvastatin (LIPITOR) tablet 40 mg  40 mg Per NG Tube Nightly Lucien Villalobos MD   40 mg at 08/11/23 2003   • [Held by provider]  HYDROcodone-acetaminophen (NORCO) 5-325 MG per tablet 1 tablet  1 tablet Per NG Tube Q4H PRN Lucien Villalobos MD   1 tablet at 08/08/23 0255   • potassium CHLORIDE (KLOR-CON) packet 40 mEq  40 mEq Per NG Tube PRN Lucien Villalobos MD   40 mEq at 08/07/23 1727    Or   • potassium CHLORIDE 40 mEq/100 mL IVPB premix  40 mEq Intravenous PRN Lucien Villalobos MD   Completed at 08/11/23 0809   • potassium CHLORIDE (KLOR-CON) packet 20 mEq  20 mEq Per NG Tube PRN Lucien Villalobos MD        Or   • potassium CHLORIDE 20 MEQ/50ML IVPB premix 20 mEq  20 mEq Intravenous PRN Lucien Villalobos MD   Completed at 08/08/23 2015   • [Held by provider] docusate sodium-sennosides (SENOKOT S) 50-8.6 MG 2 tablet  2 tablet Per NG Tube BID Lucien Villalobos MD   2 tablet at 08/08/23 2120   • magnesium hydroxide (MILK OF MAGNESIA) 400 MG/5ML suspension 30 mL  30 mL Per NG Tube Q12H PRN Lucien Villalobos MD   30 mL at 08/07/23 0547   • dextrose (GLUTOSE) 40 % gel 15 g  15 g Per NG Tube PRN Lucien Villalobos MD       • dextrose (GLUTOSE) 40 % gel 30 g  30 g Per NG Tube PRN Lucien Villalobos MD       • magnesium oxide (MAG-OX) tablet 400 mg  400 mg Per NG Tube BID PRN Lucien Villalobos MD       • sodium chloride 0.9% infusion   Intravenous Continuous PRN Lucien Villalobos MD       • morphine injection 4 mg  4 mg Intravenous Q1H PRN Lamine Stockton PA-C       • fentaNYL (SUBLIMAZE) injection 25 mcg  25 mcg Intravenous Q1H PRN Lamine Stokcton PA-C   25 mcg at 08/11/23 0904   • ondansetron (ZOFRAN ODT) disintegrating tablet 4 mg  4 mg Oral Q12H PRN Lucien Villalobos MD        Or   • ondansetron (ZOFRAN) injection 4 mg  4 mg Intravenous Q12H PRN Lucien Villalobos MD   4 mg at 08/08/23 1905   • niCARdipine (CARDENE) 40 mg/200 mL in NaCl infusion  0-15 mg/hr Intravenous Continuous Lamine Stockton PA-C   Completed at 08/09/23 0519   • lidocaine (XYLOCAINE) bolus from bag 61 mg  1 mg/kg (Dosing Weight) Intravenous PRN  Lamine Stockton PA-C       • lidocaine (XYLOCAINE) 2,000 mg/500 mL in dextrose 5 % infusion  2 mg/min Intravenous Continuous PRN Lamine Stockton PA-C       • MIDazolam (VERSED) injection 1 mg  1 mg Intravenous Q1H PRN Lamine Stockton PA-C   1 mg at 08/06/23 0045   • sodium bicarbonate 8.4 % injection 50 mEq  50 mEq Intravenous PRN Lamine Stockton PA-C       • chlorhexidine gluconate (PERIDEX) 0.12 % solution 15 mL  15 mL Swish & Spit PRN Lamine Stockton PA-C       • potassium CHLORIDE 60 mEq in sodium chloride 0.9 % 150 mL total volume IVPB  60 mEq Intravenous PRN Lamine Stockton PA-C   Completed at 08/06/23 1504   • bisacodyl (DULCOLAX) suppository 10 mg  10 mg Rectal Daily PRN Lamine Stockton PA-C   10 mg at 08/08/23 0331   • sodium biphosphate (FLEET) enema  1 enema Rectal Once PRN Lamine Stockton PA-C       • dexMEDEtomidine (PRECEDEX) 400 mcg/100 mL in sodium chloride 0.9 % infusion  0-1.5 mcg/kg/hr (Dosing Weight) Intravenous Continuous PRN Lamine Stockton PA-C   Completed at 08/10/23 0738   • dextrose 5 % / sodium chloride 0.45% with KCl 40 mEq infusion   Intravenous Continuous Lamine Stockton PA-C       • sodium chloride 0.9% infusion   Intravenous Continuous Lamine Stockton PA-C 3 mL/hr at 08/12/23 0659 Rate Verify at 08/12/23 0659   • sodium chloride 0.9% infusion   Intravenous Continuous Lamine Stockton PA-C 3 mL/hr at 08/12/23 0659 Rate Verify at 08/12/23 0659   • sodium chloride 0.9% infusion   Intravenous Continuous Lamine Stockton PA-C   Completed at 08/11/23 1133   • sodium chloride 0.9% infusion   Intravenous Continuous Lamine Stockton PA-C   Paused at 08/11/23 1107   • dextrose 5 % infusion   Intravenous Continuous Lamine Stockton PA-C       • dextrose 50 % injection 25 g  25 g Intravenous PRN Lamine Stockton PA-C       • dextrose 50 % injection 12.5 g  12.5 g Intravenous PRN Lamine Stockton PA-C       • glucagon (GLUCAGEN) injection 1 mg  1 mg Intramuscular PRN Lamine Stockton,  TABITHA       • dextrose 5 % / sodium chloride 0.45% infusion   Intravenous Continuous Lamine Stockton PA-C 20 mL/hr at 08/11/23 1159 Rate Verify at 08/11/23 1159   • albumin human 5 % injection 12.5 g  12.5 g Intravenous PRN Lucien Villalobos MD   Completed at 08/09/23 0540   • sodium chloride 0.9% infusion   Intravenous Continuous PRN Lucien Villalobos MD       • ondansetron (ZOFRAN) injection 4 mg  4 mg Intravenous Q12H PRN Diego Kaufman MD   4 mg at 08/03/23 1930   • sodium chloride (PF) 0.9 % injection 20 mL  20 mL Injection PRN Diego Kaufman MD       • sodium chloride 0.9% infusion   Intravenous Continuous Diego Kaufman MD   Completed at 08/04/23 0913   • sodium chloride 0.9% infusion   Intravenous Continuous Diego Kaufman MD 10 mL/hr at 08/12/23 0659 Rate Verify at 08/12/23 0659   • sodium chloride 0.9 % flush bag 25 mL  25 mL Intravenous PRN Diego Kaufman MD       • sodium chloride (PF) 0.9 % injection 2 mL  2 mL Intracatheter 2 times per day Diego Kaufman MD   2 mL at 08/11/23 2118   • sodium chloride (NORMAL SALINE) 0.9 % bolus 500 mL  500 mL Intravenous PRN Diego Kaufman MD       • dextrose 50 % injection 25 g  25 g Intravenous PRN Diego Kaufman MD       • dextrose 50 % injection 12.5 g  12.5 g Intravenous PRN Diego Kaufman MD       • glucagon (GLUCAGEN) injection 1 mg  1 mg Intramuscular PRN Diego Kaufman MD       • dextrose 5 % / sodium chloride 0.45% infusion   Intravenous Continuous Diego Kaufman MD   Completed at 08/11/23 1133   • vasopressin (VASOSTRICT) 20 unit/100 mL dextrose 5 % infusion  0-0.1 Units/min Intravenous Continuous Diego Kaufman MD   Completed at 08/01/23 2100         Labs     Recent Results (from the past 72 hour(s))   Prepare Platelets: 1 Units    Collection Time: 08/09/23  1:29 PM   Result Value Ref Range    UNIT BLOOD TYPE A Pos     ISBT BLOOD TYPE 6200     BLOOD EXPIRATION DATE 53001112061284     UNIT NUMBER [id number]     DISPENSE STATUS Transfused     PRODUCT  ID Platelets     PRODUCT CODE [id number]     PRODUCT DESCRIPTION SDP ACD-A>PAS-C LR     ISSUE DATE/TIME [id number]    Prepare Red Blood Cells: 1 Units    Collection Time: 08/09/23  2:23 PM   Result Value Ref Range    UNIT BLOOD TYPE A Neg     ISBT BLOOD TYPE 0600     BLOOD EXPIRATION DATE [id number]     UNIT NUMBER [id number]     DISPENSE STATUS Transfused     PRODUCT ID Red Blood Cells     PRODUCT CODE [id number]     PRODUCT DESCRIPTION RBC AS-3 LR     CROSSMATCH RESULT Compatible     ISSUE DATE/TIME [id number]    Calcium, Ionized    Collection Time: 08/09/23  5:08 PM   Result Value Ref Range    Ionized Calcium 1.10 (L) 1.15 - 1.29 mmol/L    Ionized Calcium, Corrected 1.09 (L) 1.15 - 1.29 mmol/L   Magnesium    Collection Time: 08/09/23  5:08 PM   Result Value Ref Range    Magnesium 2.5 (H) 1.7 - 2.4 mg/dL   Phosphorus    Collection Time: 08/09/23  5:08 PM   Result Value Ref Range    Phosphorus 4.2 2.4 - 4.7 mg/dL   Basic Metabolic Panel    Collection Time: 08/09/23  5:08 PM   Result Value Ref Range    Fasting Status      Sodium 140 135 - 145 mmol/L    Potassium 3.4 3.4 - 5.1 mmol/L    Chloride 108 97 - 110 mmol/L    Carbon Dioxide 24 21 - 32 mmol/L    Anion Gap 11 7 - 19 mmol/L    Glucose 139 (H) 70 - 99 mg/dL    BUN 33 (H) 6 - 20 mg/dL    Creatinine 0.95 0.51 - 0.95 mg/dL    Glomerular Filtration Rate 68 >=60    BUN/Cr 35 (H) 7 - 25    Calcium 7.8 (L) 8.4 - 10.2 mg/dL   GLUCOSE, BEDSIDE - POINT OF CARE    Collection Time: 08/09/23  5:09 PM   Result Value Ref Range    GLUCOSE, BEDSIDE - POINT OF CARE 145 (H) 70 - 99 mg/dL   Hemoglobin    Collection Time: 08/09/23  6:35 PM   Result Value Ref Range    HGB 9.6 (L) 12.0 - 15.5 g/dL   Hematocrit    Collection Time: 08/09/23  6:35 PM   Result Value Ref Range    HCT 29.1 (L) 36.0 - 46.5 %   CBC No Differential    Collection Time: 08/09/23  6:35 PM   Result Value Ref Range    WBC 16.9 (H) 4.2 - 11.0 K/mcL    RBC 3.29 (L) 4.00 - 5.20 mil/mcL    HGB 9.6 (L) 12.0 -  15.5 g/dL    HCT 29.1 (L) 36.0 - 46.5 %    MCV 88.7 78.0 - 100.0 fl    MCH 29.5 26.0 - 34.0 pg    MCHC 33.0 32.0 - 36.5 g/dL    PLT 82 (L) 140 - 450 K/mcL    RDW-CV 17.5 (H) 11.0 - 15.0 %    RDW-SD 57.4 (H) 39.0 - 50.0 fL    NRBC 0 <=0 /100 WBC   TYPE/SCREEN    Collection Time: 08/09/23  9:43 PM   Result Value Ref Range    ABO/RH(D) A Rh Negative     ANTIBODY SCREEN Negative     TYPE AND SCREEN EXPIRATION DATE 08/12/2023 23:59    BLOOD GAS, ARTERIAL WITH COOXIMETRY - RESPIRATORY    Collection Time: 08/09/23  9:48 PM   Result Value Ref Range    BASE EXCESS / DEFICIT, ARTERIAL - RESPIRATORY 2 -2 - 3 mmol/L    HCO3, ARTERIAL - RESPIRATORY 27 22 - 28 mmol/L    O2 CONTENT, ARTERIAL - RESPIRATORY 14 (L) 15 - 23 %    PCO2, ARTERIAL - RESPIRATORY 38 32 - 45 mm Hg    PH, ARTERIAL - RESPIRATORY 7.45 7.35 - 7.45 Units    PO2, ARTERIAL - RESPIRATORY 167 (H) 83 - 108 mm Hg    O2 SATURATION, ARTERIAL - RESPIRATORY 100 (H) 95 - 99 %    CONDITION - RESPIRATORY NC 6LPM IDRIS 40PPM     CARBOXYHEMOGLOBIN - RESPIRATORY 1.6 (H) <1.5 %    HEMOGLOBIN - RESPIRATORY 10.1 (L) 12.0 - 15.5 g/dL    METHEMOGLOBIN - RESPIRATORY 1.4 <=1.6 %    OXYHEMOGLOBIN, ARTERIAL - RESPIRATORY 96.8 94.0 - 98.0 %    SITE - RESPIRATORY Arterial Line     TEMPERATURE - RESPIRATORY 36.4 degrees   POTASSIUM - RESPIRATORY    Collection Time: 08/09/23  9:48 PM   Result Value Ref Range    POTASSIUM - RESPIRATORY 4.4 3.4 - 5.1 mmol/L   SODIUM - RESPIRATORY    Collection Time: 08/09/23  9:48 PM   Result Value Ref Range    SODIUM - RESPIRATORY 136 135 - 145 mmol/L   CALCIUM, IONIZED - RESPIRATORY    Collection Time: 08/09/23  9:48 PM   Result Value Ref Range    CALCIUM, IONIZED - RESPIRATORY 1.13 (L) 1.15 - 1.29 mmol/L   LACTIC ACID, ARTERIAL - RESPIRATORY    Collection Time: 08/09/23  9:48 PM   Result Value Ref Range    LACTIC ACID, ARTERIAL - RESPIRATORY 1.1 <1.6 mmol/L   GLUCOSE, BEDSIDE - POINT OF CARE    Collection Time: 08/10/23 12:11 AM   Result Value Ref Range     GLUCOSE, BEDSIDE - POINT OF CARE 143 (H) 70 - 99 mg/dL   Magnesium    Collection Time: 08/10/23  3:48 AM   Result Value Ref Range    Magnesium 2.4 1.7 - 2.4 mg/dL   Phosphorus    Collection Time: 08/10/23  3:48 AM   Result Value Ref Range    Phosphorus 2.3 (L) 2.4 - 4.7 mg/dL   Partial Thromboplastin Time    Collection Time: 08/10/23  3:48 AM   Result Value Ref Range    PTT 39 (H) 22 - 30 sec   Comprehensive Metabolic Panel    Collection Time: 08/10/23  3:48 AM   Result Value Ref Range    Fasting Status      Sodium 142 135 - 145 mmol/L    Potassium 3.8 3.4 - 5.1 mmol/L    Chloride 110 97 - 110 mmol/L    Carbon Dioxide 25 21 - 32 mmol/L    Anion Gap 11 7 - 19 mmol/L    Glucose 137 (H) 70 - 99 mg/dL    BUN 27 (H) 6 - 20 mg/dL    Creatinine 0.79 0.51 - 0.95 mg/dL    Glomerular Filtration Rate 85 >=60    BUN/Cr 34 (H) 7 - 25    Calcium 7.8 (L) 8.4 - 10.2 mg/dL    Bilirubin, Total 1.5 (H) 0.2 - 1.0 mg/dL    GOT/AST 92 (H) <=37 Units/L    GPT/ALT 64 (H) <64 Units/L    Alkaline Phosphatase 194 (H) 45 - 117 Units/L    Albumin 2.4 (L) 3.6 - 5.1 g/dL    Protein, Total 5.0 (L) 6.4 - 8.2 g/dL    Globulin 2.6 2.0 - 4.0 g/dL    A/G Ratio 0.9 (L) 1.0 - 2.4   Prothrombin Time (INR/PT)    Collection Time: 08/10/23  3:48 AM   Result Value Ref Range    Protime- PT 11.4 9.7 - 11.8 sec    INR 1.1     CBC No Differential    Collection Time: 08/10/23  3:48 AM   Result Value Ref Range    WBC 16.4 (H) 4.2 - 11.0 K/mcL    RBC 3.11 (L) 4.00 - 5.20 mil/mcL    HGB 9.0 (L) 12.0 - 15.5 g/dL    HCT 27.8 (L) 36.0 - 46.5 %    MCV 89.4 78.0 - 100.0 fl    MCH 28.9 26.0 - 34.0 pg    MCHC 32.4 32.0 - 36.5 g/dL    PLT 84 (L) 140 - 450 K/mcL    RDW-CV 17.7 (H) 11.0 - 15.0 %    RDW-SD 57.5 (H) 39.0 - 50.0 fL    NRBC 0 <=0 /100 WBC   NT proBNP    Collection Time: 08/10/23  3:48 AM   Result Value Ref Range    NT-proBNP 10,078 (H) <=125 pg/mL   Fibrinogen Activity    Collection Time: 08/10/23  3:48 AM   Result Value Ref Range    Fibrinogen 262 190 - 425 mg/dL    Lactate Dehydrogenase    Collection Time: 08/10/23  3:48 AM   Result Value Ref Range    LD, Total 539 (H) 82 - 240 Units/L   BLOOD GAS, ARTERIAL WITH COOXIMETRY - RESPIRATORY    Collection Time: 08/10/23  3:53 AM   Result Value Ref Range    BASE EXCESS / DEFICIT, ARTERIAL - RESPIRATORY 2 -2 - 3 mmol/L    HCO3, ARTERIAL - RESPIRATORY 27 22 - 28 mmol/L    O2 CONTENT, ARTERIAL - RESPIRATORY 13 (L) 15 - 23 %    PCO2, ARTERIAL - RESPIRATORY 45 32 - 45 mm Hg    PH, ARTERIAL - RESPIRATORY 7.39 7.35 - 7.45 Units    PO2, ARTERIAL - RESPIRATORY 184 (H) 83 - 108 mm Hg    O2 SATURATION, ARTERIAL - RESPIRATORY 100 (H) 95 - 99 %    CONDITION - RESPIRATORY NC 6LPM IDRIS 40PPM     CARBOXYHEMOGLOBIN - RESPIRATORY 1.9 (H) <1.5 %    HEMOGLOBIN - RESPIRATORY 9.3 (L) 12.0 - 15.5 g/dL    METHEMOGLOBIN - RESPIRATORY 1.0 <=1.6 %    OXYHEMOGLOBIN, ARTERIAL - RESPIRATORY 96.9 94.0 - 98.0 %    SITE - RESPIRATORY Arterial Line     TEMPERATURE - RESPIRATORY 37.2 degrees   POTASSIUM - RESPIRATORY    Collection Time: 08/10/23  3:53 AM   Result Value Ref Range    POTASSIUM - RESPIRATORY 3.7 3.4 - 5.1 mmol/L   SODIUM - RESPIRATORY    Collection Time: 08/10/23  3:53 AM   Result Value Ref Range    SODIUM - RESPIRATORY 136 135 - 145 mmol/L   CALCIUM, IONIZED - RESPIRATORY    Collection Time: 08/10/23  3:53 AM   Result Value Ref Range    CALCIUM, IONIZED - RESPIRATORY 1.16 1.15 - 1.29 mmol/L   LACTIC ACID, ARTERIAL - RESPIRATORY    Collection Time: 08/10/23  3:53 AM   Result Value Ref Range    LACTIC ACID, ARTERIAL - RESPIRATORY 1.0 <1.6 mmol/L   Vancomycin, Random    Collection Time: 08/10/23 12:49 PM   Result Value Ref Range    Vancomycin, Random 14.1 <=40.0 mcg/mL   GLUCOSE, BEDSIDE - POINT OF CARE    Collection Time: 08/10/23 12:50 PM   Result Value Ref Range    GLUCOSE, BEDSIDE - POINT OF CARE 132 (H) 70 - 99 mg/dL   CBC No Differential    Collection Time: 08/10/23  3:23 PM   Result Value Ref Range    WBC 15.3 (H) 4.2 - 11.0 K/mcL    RBC 3.16 (L)  4.00 - 5.20 mil/mcL    HGB 9.1 (L) 12.0 - 15.5 g/dL    HCT 28.2 (L) 36.0 - 46.5 %    MCV 89.2 78.0 - 100.0 fl    MCH 28.8 26.0 - 34.0 pg    MCHC 32.3 32.0 - 36.5 g/dL    PLT 74 (L) 140 - 450 K/mcL    RDW-CV 17.6 (H) 11.0 - 15.0 %    RDW-SD 56.9 (H) 39.0 - 50.0 fL    NRBC 0 <=0 /100 WBC   Magnesium    Collection Time: 08/10/23  5:01 PM   Result Value Ref Range    Magnesium 2.3 1.7 - 2.4 mg/dL   Phosphorus    Collection Time: 08/10/23  5:01 PM   Result Value Ref Range    Phosphorus 2.8 2.4 - 4.7 mg/dL   Basic Metabolic Panel    Collection Time: 08/10/23  5:01 PM   Result Value Ref Range    Fasting Status      Sodium 144 135 - 145 mmol/L    Potassium 3.3 (L) 3.4 - 5.1 mmol/L    Chloride 111 (H) 97 - 110 mmol/L    Carbon Dioxide 25 21 - 32 mmol/L    Anion Gap 11 7 - 19 mmol/L    Glucose 116 (H) 70 - 99 mg/dL    BUN 25 (H) 6 - 20 mg/dL    Creatinine 0.74 0.51 - 0.95 mg/dL    Glomerular Filtration Rate >90 >=60    BUN/Cr 34 (H) 7 - 25    Calcium 7.8 (L) 8.4 - 10.2 mg/dL   GLUCOSE, BEDSIDE - POINT OF CARE    Collection Time: 08/10/23  5:05 PM   Result Value Ref Range    GLUCOSE, BEDSIDE - POINT OF CARE 121 (H) 70 - 99 mg/dL   BLOOD GAS, ARTERIAL WITH COOXIMETRY - RESPIRATORY    Collection Time: 08/10/23  6:13 PM   Result Value Ref Range    BASE EXCESS / DEFICIT, ARTERIAL - RESPIRATORY 3 -2 - 3 mmol/L    HCO3, ARTERIAL - RESPIRATORY 27 22 - 28 mmol/L    O2 CONTENT, ARTERIAL - RESPIRATORY 13 (L) 15 - 23 %    PCO2, ARTERIAL - RESPIRATORY 37 32 - 45 mm Hg    PH, ARTERIAL - RESPIRATORY 7.46 (H) 7.35 - 7.45 Units    PO2, ARTERIAL - RESPIRATORY 125 (H) 83 - 108 mm Hg    O2 SATURATION, ARTERIAL - RESPIRATORY 100 (H) 95 - 99 %    CONDITION - RESPIRATORY 6L NC IDRIS 20PPM     CARBOXYHEMOGLOBIN - RESPIRATORY 1.8 (H) <1.5 %    HEMOGLOBIN - RESPIRATORY 9.2 (L) 12.0 - 15.5 g/dL    METHEMOGLOBIN - RESPIRATORY 1.4 <=1.6 %    OXYHEMOGLOBIN, ARTERIAL - RESPIRATORY 96.6 94.0 - 98.0 %    SITE - RESPIRATORY Arterial Line     TEMPERATURE -  RESPIRATORY 35.5 degrees   POTASSIUM - RESPIRATORY    Collection Time: 08/10/23  6:13 PM   Result Value Ref Range    POTASSIUM - RESPIRATORY 3.1 (L) 3.4 - 5.1 mmol/L   SODIUM - RESPIRATORY    Collection Time: 08/10/23  6:13 PM   Result Value Ref Range    SODIUM - RESPIRATORY 138 135 - 145 mmol/L   CALCIUM, IONIZED - RESPIRATORY    Collection Time: 08/10/23  6:13 PM   Result Value Ref Range    CALCIUM, IONIZED - RESPIRATORY 1.15 1.15 - 1.29 mmol/L   LACTIC ACID, ARTERIAL - RESPIRATORY    Collection Time: 08/10/23  6:13 PM   Result Value Ref Range    LACTIC ACID, ARTERIAL - RESPIRATORY 0.9 <1.6 mmol/L   GLUCOSE, BEDSIDE - POINT OF CARE    Collection Time: 08/10/23 11:21 PM   Result Value Ref Range    GLUCOSE, BEDSIDE - POINT OF CARE 150 (H) 70 - 99 mg/dL   BLOOD GAS, ARTERIAL WITH COOXIMETRY - RESPIRATORY    Collection Time: 08/10/23 11:24 PM   Result Value Ref Range    BASE EXCESS / DEFICIT, ARTERIAL - RESPIRATORY 3 -2 - 3 mmol/L    HCO3, ARTERIAL - RESPIRATORY 28 22 - 28 mmol/L    O2 CONTENT, ARTERIAL - RESPIRATORY 13 (L) 15 - 23 %    PCO2, ARTERIAL - RESPIRATORY 43 32 - 45 mm Hg    PH, ARTERIAL - RESPIRATORY 7.42 7.35 - 7.45 Units    PO2, ARTERIAL - RESPIRATORY 115 (H) 83 - 108 mm Hg    O2 SATURATION, ARTERIAL - RESPIRATORY 99 95 - 99 %    CONDITION - RESPIRATORY NC 6L IDRIS 20PPM     CARBOXYHEMOGLOBIN - RESPIRATORY 1.7 (H) <1.5 %    HEMOGLOBIN - RESPIRATORY 9.2 (L) 12.0 - 15.5 g/dL    METHEMOGLOBIN - RESPIRATORY 1.2 <=1.6 %    OXYHEMOGLOBIN, ARTERIAL - RESPIRATORY 96.4 94.0 - 98.0 %    SITE - RESPIRATORY Arterial Line     TEMPERATURE - RESPIRATORY 36.4 degrees   POTASSIUM - RESPIRATORY    Collection Time: 08/10/23 11:24 PM   Result Value Ref Range    POTASSIUM - RESPIRATORY 4.2 3.4 - 5.1 mmol/L   SODIUM - RESPIRATORY    Collection Time: 08/10/23 11:24 PM   Result Value Ref Range    SODIUM - RESPIRATORY 138 135 - 145 mmol/L   CALCIUM, IONIZED - RESPIRATORY    Collection Time: 08/10/23 11:24 PM   Result Value Ref  Range    CALCIUM, IONIZED - RESPIRATORY 1.19 1.15 - 1.29 mmol/L   LACTIC ACID, ARTERIAL - RESPIRATORY    Collection Time: 08/10/23 11:24 PM   Result Value Ref Range    LACTIC ACID, ARTERIAL - RESPIRATORY 0.9 <1.6 mmol/L   Calcium, Ionized    Collection Time: 08/11/23  3:03 AM   Result Value Ref Range    Ionized Calcium 1.19 1.15 - 1.29 mmol/L    Ionized Calcium, Corrected 1.16 1.15 - 1.29 mmol/L   CBC No Differential    Collection Time: 08/11/23  3:03 AM   Result Value Ref Range    WBC 13.7 (H) 4.2 - 11.0 K/mcL    RBC 2.82 (L) 4.00 - 5.20 mil/mcL    HGB 8.2 (L) 12.0 - 15.5 g/dL    HCT 25.6 (L) 36.0 - 46.5 %    MCV 90.8 78.0 - 100.0 fl    MCH 29.1 26.0 - 34.0 pg    MCHC 32.0 32.0 - 36.5 g/dL    PLT 79 (L) 140 - 450 K/mcL    RDW-CV 17.8 (H) 11.0 - 15.0 %    RDW-SD 58.8 (H) 39.0 - 50.0 fL    NRBC 0 <=0 /100 WBC   Magnesium    Collection Time: 08/11/23  3:03 AM   Result Value Ref Range    Magnesium 2.2 1.7 - 2.4 mg/dL   Phosphorus    Collection Time: 08/11/23  3:03 AM   Result Value Ref Range    Phosphorus 1.6 (L) 2.4 - 4.7 mg/dL   Partial Thromboplastin Time    Collection Time: 08/11/23  3:03 AM   Result Value Ref Range    PTT 36 (H) 22 - 30 sec   Comprehensive Metabolic Panel    Collection Time: 08/11/23  3:03 AM   Result Value Ref Range    Fasting Status      Sodium 144 135 - 145 mmol/L    Potassium 3.4 3.4 - 5.1 mmol/L    Chloride 113 (H) 97 - 110 mmol/L    Carbon Dioxide 26 21 - 32 mmol/L    Anion Gap 8 7 - 19 mmol/L    Glucose 135 (H) 70 - 99 mg/dL    BUN 27 (H) 6 - 20 mg/dL    Creatinine 0.87 0.51 - 0.95 mg/dL    Glomerular Filtration Rate 75 >=60    BUN/Cr 31 (H) 7 - 25    Calcium 7.5 (L) 8.4 - 10.2 mg/dL    Bilirubin, Total 1.5 (H) 0.2 - 1.0 mg/dL    GOT/AST 79 (H) <=37 Units/L    GPT/ALT 63 <64 Units/L    Alkaline Phosphatase 189 (H) 45 - 117 Units/L    Albumin 2.2 (L) 3.6 - 5.1 g/dL    Protein, Total 4.9 (L) 6.4 - 8.2 g/dL    Globulin 2.7 2.0 - 4.0 g/dL    A/G Ratio 0.8 (L) 1.0 - 2.4   Prothrombin Time  (INR/PT)    Collection Time: 08/11/23  3:03 AM   Result Value Ref Range    Protime- PT 10.6 9.7 - 11.8 sec    INR 1.0     Lactate Dehydrogenase    Collection Time: 08/11/23  3:03 AM   Result Value Ref Range    LD, Total 556 (H) 82 - 240 Units/L   NT proBNP    Collection Time: 08/11/23  3:03 AM   Result Value Ref Range    NT-proBNP 11,601 (H) <=125 pg/mL   Vitamin B12 And Folate    Collection Time: 08/11/23  3:03 AM   Result Value Ref Range    Vitamin B12 1,698 (H) 211 - 911 pg/mL    Folate 13.6 >=5.5 ng/mL   Thyroid Stimulating Hormone Reflex    Collection Time: 08/11/23  3:03 AM   Result Value Ref Range    TSH 0.923 0.350 - 5.000 mcUnits/mL   BLOOD GAS, ARTERIAL WITH COOXIMETRY - RESPIRATORY    Collection Time: 08/11/23  3:15 AM   Result Value Ref Range    BASE EXCESS / DEFICIT, ARTERIAL - RESPIRATORY 2 -2 - 3 mmol/L    HCO3, ARTERIAL - RESPIRATORY 27 22 - 28 mmol/L    O2 CONTENT, ARTERIAL - RESPIRATORY 12 (L) 15 - 23 %    PCO2, ARTERIAL - RESPIRATORY 40 32 - 45 mm Hg    PH, ARTERIAL - RESPIRATORY 7.43 7.35 - 7.45 Units    PO2, ARTERIAL - RESPIRATORY 152 (H) 83 - 108 mm Hg    O2 SATURATION, ARTERIAL - RESPIRATORY 99 95 - 99 %    CONDITION - RESPIRATORY NC 6L IDRIS 20PPM     CARBOXYHEMOGLOBIN - RESPIRATORY 1.4 <1.5 %    HEMOGLOBIN - RESPIRATORY 8.3 (L) 12.0 - 15.5 g/dL    METHEMOGLOBIN - RESPIRATORY 1.4 <=1.6 %    OXYHEMOGLOBIN, ARTERIAL - RESPIRATORY 96.6 94.0 - 98.0 %    SITE - RESPIRATORY Arterial Line     TEMPERATURE - RESPIRATORY 37.0 degrees   POTASSIUM - RESPIRATORY    Collection Time: 08/11/23  3:15 AM   Result Value Ref Range    POTASSIUM - RESPIRATORY 3.4 3.4 - 5.1 mmol/L   SODIUM - RESPIRATORY    Collection Time: 08/11/23  3:15 AM   Result Value Ref Range    SODIUM - RESPIRATORY 138 135 - 145 mmol/L   CALCIUM, IONIZED - RESPIRATORY    Collection Time: 08/11/23  3:15 AM   Result Value Ref Range    CALCIUM, IONIZED - RESPIRATORY 1.16 1.15 - 1.29 mmol/L   LACTIC ACID, ARTERIAL - RESPIRATORY    Collection Time:  08/11/23  3:15 AM   Result Value Ref Range    LACTIC ACID, ARTERIAL - RESPIRATORY 1.0 <1.6 mmol/L   GLUCOSE, BEDSIDE - POINT OF CARE    Collection Time: 08/11/23  6:11 AM   Result Value Ref Range    GLUCOSE, BEDSIDE - POINT OF CARE 145 (H) 70 - 99 mg/dL   Prepare Red Blood Cells: 1 Units    Collection Time: 08/11/23  7:51 AM   Result Value Ref Range    UNIT BLOOD TYPE A Neg     ISBT BLOOD TYPE 0600     BLOOD EXPIRATION DATE [id number]     UNIT NUMBER [id number]     DISPENSE STATUS Transfused     PRODUCT ID Red Blood Cells     PRODUCT CODE [id number]     PRODUCT DESCRIPTION RBC AS-3 LR     CROSSMATCH RESULT Compatible     ISSUE DATE/TIME [id number]    CBC No Differential    Collection Time: 08/11/23 12:31 PM   Result Value Ref Range    WBC 15.7 (H) 4.2 - 11.0 K/mcL    RBC 3.27 (L) 4.00 - 5.20 mil/mcL    HGB 9.3 (L) 12.0 - 15.5 g/dL    HCT 29.0 (L) 36.0 - 46.5 %    MCV 88.7 78.0 - 100.0 fl    MCH 28.4 26.0 - 34.0 pg    MCHC 32.1 32.0 - 36.5 g/dL    PLT 72 (L) 140 - 450 K/mcL    RDW-CV 18.2 (H) 11.0 - 15.0 %    RDW-SD 58.8 (H) 39.0 - 50.0 fL    NRBC 0 <=0 /100 WBC   Magnesium    Collection Time: 08/11/23 12:31 PM   Result Value Ref Range    Magnesium 2.4 1.7 - 2.4 mg/dL   Phosphorus    Collection Time: 08/11/23 12:31 PM   Result Value Ref Range    Phosphorus 1.3 (L) 2.4 - 4.7 mg/dL   Potassium    Collection Time: 08/11/23 12:31 PM   Result Value Ref Range    Potassium 3.6 3.4 - 5.1 mmol/L   GLUCOSE, BEDSIDE - POINT OF CARE    Collection Time: 08/11/23 12:35 PM   Result Value Ref Range    GLUCOSE, BEDSIDE - POINT OF CARE 118 (H) 70 - 99 mg/dL   Ammonia    Collection Time: 08/11/23  1:56 PM   Result Value Ref Range    Ammonia 35 (H) <=33 mcmol/L   Basic Metabolic Panel    Collection Time: 08/11/23  4:11 PM   Result Value Ref Range    Fasting Status      Sodium 143 135 - 145 mmol/L    Potassium 3.6 3.4 - 5.1 mmol/L    Chloride 112 (H) 97 - 110 mmol/L    Carbon Dioxide 27 21 - 32 mmol/L    Anion Gap 8 7 - 19  mmol/L    Glucose 111 (H) 70 - 99 mg/dL    BUN 22 (H) 6 - 20 mg/dL    Creatinine 0.69 0.51 - 0.95 mg/dL    Glomerular Filtration Rate >90 >=60    BUN/Cr 32 (H) 7 - 25    Calcium 8.0 (L) 8.4 - 10.2 mg/dL   Magnesium    Collection Time: 08/11/23  4:11 PM   Result Value Ref Range    Magnesium 2.4 1.7 - 2.4 mg/dL   Phosphorus    Collection Time: 08/11/23  4:11 PM   Result Value Ref Range    Phosphorus 1.4 (L) 2.4 - 4.7 mg/dL   GLUCOSE, BEDSIDE - POINT OF CARE    Collection Time: 08/11/23  6:22 PM   Result Value Ref Range    GLUCOSE, BEDSIDE - POINT OF CARE 107 (H) 70 - 99 mg/dL   Potassium    Collection Time: 08/11/23  9:13 PM   Result Value Ref Range    Potassium 3.8 3.4 - 5.1 mmol/L   Magnesium    Collection Time: 08/11/23  9:13 PM   Result Value Ref Range    Magnesium 2.3 1.7 - 2.4 mg/dL   GLUCOSE, BEDSIDE - POINT OF CARE    Collection Time: 08/11/23  9:17 PM   Result Value Ref Range    GLUCOSE, BEDSIDE - POINT OF CARE 123 (H) 70 - 99 mg/dL   GLUCOSE, BEDSIDE - POINT OF CARE    Collection Time: 08/12/23 12:26 AM   Result Value Ref Range    GLUCOSE, BEDSIDE - POINT OF CARE 125 (H) 70 - 99 mg/dL   Magnesium    Collection Time: 08/12/23  3:50 AM   Result Value Ref Range    Magnesium 2.3 1.7 - 2.4 mg/dL   Phosphorus    Collection Time: 08/12/23  3:50 AM   Result Value Ref Range    Phosphorus 2.3 (L) 2.4 - 4.7 mg/dL   Partial Thromboplastin Time    Collection Time: 08/12/23  3:50 AM   Result Value Ref Range    PTT 44 (H) 22 - 30 sec   Comprehensive Metabolic Panel    Collection Time: 08/12/23  3:50 AM   Result Value Ref Range    Fasting Status      Sodium 141 135 - 145 mmol/L    Potassium 4.0 3.4 - 5.1 mmol/L    Chloride 109 97 - 110 mmol/L    Carbon Dioxide 30 21 - 32 mmol/L    Anion Gap 6 (L) 7 - 19 mmol/L    Glucose 141 (H) 70 - 99 mg/dL    BUN 26 (H) 6 - 20 mg/dL    Creatinine 0.76 0.51 - 0.95 mg/dL    Glomerular Filtration Rate 89 >=60    BUN/Cr 34 (H) 7 - 25    Calcium 8.1 (L) 8.4 - 10.2 mg/dL    Bilirubin, Total  1.2 (H) 0.2 - 1.0 mg/dL    GOT/AST 73 (H) <=37 Units/L    GPT/ALT 65 (H) <64 Units/L    Alkaline Phosphatase 205 (H) 45 - 117 Units/L    Albumin 2.0 (L) 3.6 - 5.1 g/dL    Protein, Total 4.7 (L) 6.4 - 8.2 g/dL    Globulin 2.7 2.0 - 4.0 g/dL    A/G Ratio 0.7 (L) 1.0 - 2.4   Prothrombin Time (INR/PT)    Collection Time: 08/12/23  3:50 AM   Result Value Ref Range    Protime- PT 10.3 9.7 - 11.8 sec    INR 1.0     CBC No Differential    Collection Time: 08/12/23  3:50 AM   Result Value Ref Range    WBC 16.0 (H) 4.2 - 11.0 K/mcL    RBC 3.08 (L) 4.00 - 5.20 mil/mcL    HGB 8.8 (L) 12.0 - 15.5 g/dL    HCT 28.0 (L) 36.0 - 46.5 %    MCV 90.9 78.0 - 100.0 fl    MCH 28.6 26.0 - 34.0 pg    MCHC 31.4 (L) 32.0 - 36.5 g/dL    PLT 75 (L) 140 - 450 K/mcL    RDW-CV 18.6 (H) 11.0 - 15.0 %    RDW-SD 61.2 (H) 39.0 - 50.0 fL    NRBC 0 <=0 /100 WBC   Lactate Dehydrogenase    Collection Time: 08/12/23  3:50 AM   Result Value Ref Range    LD, Total 554 (H) 82 - 240 Units/L   NT proBNP    Collection Time: 08/12/23  3:50 AM   Result Value Ref Range    NT-proBNP 11,377 (H) <=125 pg/mL   GLUCOSE, BEDSIDE - POINT OF CARE    Collection Time: 08/12/23 11:56 AM   Result Value Ref Range    GLUCOSE, BEDSIDE - POINT OF CARE 124 (H) 70 - 99 mg/dL   BLOOD GAS, ARTERIAL WITH COOXIMETRY - RESPIRATORY    Collection Time: 08/12/23 11:59 AM   Result Value Ref Range    BASE EXCESS / DEFICIT, ARTERIAL - RESPIRATORY 6 (H) -2 - 3 mmol/L    HCO3, ARTERIAL - RESPIRATORY 31 (H) 22 - 28 mmol/L    O2 CONTENT, ARTERIAL - RESPIRATORY 13 (L) 15 - 23 %    PCO2, ARTERIAL - RESPIRATORY 46 (H) 32 - 45 mm Hg    PH, ARTERIAL - RESPIRATORY 7.43 7.35 - 7.45 Units    PO2, ARTERIAL - RESPIRATORY 101 83 - 108 mm Hg    O2 SATURATION, ARTERIAL - RESPIRATORY 99 95 - 99 %    CONDITION - RESPIRATORY 6L NC ECMO     CARBOXYHEMOGLOBIN - RESPIRATORY 2.0 (H) <1.5 %    HEMOGLOBIN - RESPIRATORY 9.4 (L) 12.0 - 15.5 g/dL    METHEMOGLOBIN - RESPIRATORY 1.4 <=1.6 %    OXYHEMOGLOBIN, ARTERIAL -  RESPIRATORY 95.4 94.0 - 98.0 %    SITE - RESPIRATORY Arterial Line     TEMPERATURE - RESPIRATORY 37.0 degrees   POTASSIUM - RESPIRATORY    Collection Time: 08/12/23 11:59 AM   Result Value Ref Range    POTASSIUM - RESPIRATORY 3.4 3.4 - 5.1 mmol/L   SODIUM - RESPIRATORY    Collection Time: 08/12/23 11:59 AM   Result Value Ref Range    SODIUM - RESPIRATORY 138 135 - 145 mmol/L   CALCIUM, IONIZED - RESPIRATORY    Collection Time: 08/12/23 11:59 AM   Result Value Ref Range    CALCIUM, IONIZED - RESPIRATORY 1.20 1.15 - 1.29 mmol/L   LACTIC ACID, ARTERIAL - RESPIRATORY    Collection Time: 08/12/23 11:59 AM   Result Value Ref Range    LACTIC ACID, ARTERIAL - RESPIRATORY 0.8 <1.6 mmol/L       Microbiology Results     None                 Imaging    Reviewed    CT HEAD WO CONTRAST   Final Result      No acute intracranial process.      Electronically Signed by: NICOLE SWAN MD    Signed on: 8/12/2023 12:49 PM    Workstation ID: [id number]      [facility] UPPER EXTREMITY ARTERIAL DUPLEX   Final Result      Patent bilateral upper extremity arteries.  Diffusely abnormal waveforms   likely related to heart failure and ECMO.      Electronically Signed by: JAIME NUR M.D.    Signed on: 8/12/2023 10:56 AM    Workstation ID: [id number]      XR CHEST PA OR AP 1 VIEW   Final Result      No acute change in cardiopulmonary appearance as described. Devices as   above.       Electronically Signed by: JAIME NUR M.D.    Signed on: 8/12/2023 8:29 AM    Workstation ID: [id number]      XR CHEST AP OR PA   Final Result   Impression:       Interval placement of left IJ approach intravenous catheter with the tip   terminating in the right atrium.  No appreciable left pneumothorax, though   overlying medical devices limits evaluation. Stable exam otherwise as   above.       Electronically Signed by: ALEX GONZALEZ MD    Signed on: 8/11/2023 11:20 AM    Workstation ID: [id number]      XR CHEST PA OR AP 1 VIEW   Final Result       No acute change in cardiopulmonary appearance as described. Devices as   above.       Electronically Signed by: JAIME NUR M.D.    Signed on: 8/11/2023 7:25 AM    Workstation ID: [id number]      CT HEAD LEVEL 1   Final Result   Addendum (preliminary) 1 of 1   ADDENDUM:   08/11/23 00:56 Call Doctor Regarding Stroke, called [facility]    unit [facility]   on 08/11 00:56 (-05:00)            Final     No acute hemorrhage, hydrocephalus, or mass effect.               Communications:       Call Doctor Stroke      Electronically signed by Edward Carballo MD on 08 11 23 at 00:49      XR CHEST AP OR PA   Final Result      1.   No significant change from the prior study. Lines and tubes as above.   2.   Unchanged bibasilar atelectasis/airspace disease. Unchanged trace left   apical pneumothorax.      Electronically Signed by: NICOLE SWAN MD    Signed on: 8/10/2023 8:10 AM    Workstation ID: [id number]      XR CHEST AP OR PA   Final Result   Impression:       Stable exam as above.  No appreciable pneumothorax.  Support devices as   above.      Electronically Signed by: ALEX GONZALEZ MD    Signed on: 8/9/2023 11:55 AM    Workstation ID: [id number]      XR CHEST PA OR AP 1 VIEW   Final Result   Impression:    Stable exam as above. Support devices as above.      Electronically Signed by: AVTAR ALVAREZ MD    Signed on: 8/9/2023 7:43 AM    Workstation ID: [id number]      XR CHEST AP OR PA   Final Result   Bilateral chest tubes.  Perihilar and bibasilar lung opacities, left   greater than right.  Small left pleural effusion.       Electronically Signed by: INÉS FISH MD    Signed on: 8/8/2023 7:41 AM    Workstation ID: [id number]      XR CHEST AP OR PA   Final Result   Stable support devices.  No significant change from prior exam.            Electronically Signed by: SATYA MURRIETA MD    Signed on: 8/7/2023 7:23 PM    Workstation ID: [id number]      XR CHEST AP OR PA   Final Result      No  acute change in cardiopulmonary appearance as described. Devices as   above.       Electronically Signed by: JAIME NUR M.D.    Signed on: 8/7/2023 12:55 PM    Workstation ID: [id number]      CT HEAD WO CONTRAST   Final Result      1. No acute intracranial process.            Electronically Signed by: CHESTER GLEASON MD    Signed on: 8/7/2023 9:50 AM    Workstation ID: [id number]      CT CHEST ABDOMEN PELVIS WO CONTRAST   Final Result      1.   Stable cardiomegaly with post surgical changes of interval LVAD   placement.   2.   Left greater than right bibasilar consolidation and volume loss   suggesting aspirated secretions and atelectasis.  Superimposed infection   not excluded.   3.   Stable posterior exophytic left thyroid nodule measuring 2.5 cm   projecting into the superior mediastinum.   4.   No acute abnormality in the abdomen or pelvis.   5.   Devices and other findings as above.      Electronically Signed by: JAIME NUR M.D.    Signed on: 8/7/2023 10:13 AM    Workstation ID: [id number]      XR CHEST PA OR AP 1 VIEW   Final Result   Impression:    Stable examination.  No acute cardiopulmonary abnormality.      Electronically Signed by: ALLYSON EDMOND MD    Signed on: 8/7/2023 7:42 AM    Workstation ID: [id number]      XR ABDOMEN 1 VIEW   Final Result       NG tube terminates in the gastric body.         Electronically Signed by: DESI ANGULO M.D.    Signed on: 8/6/2023 4:09 PM    Workstation ID: [id number]      XR CHEST PA OR AP 1 VIEW   Final Result   FINDINGS/IMPRESSION:       Feeding tube is coursing through the mid chest and the tip is below the   diaphragm, there is cardiomegaly, evidence of median sternotomy, LVAD,   electronic device with the lead in place, [place]-Anali catheter in the left   pulmonary outflow tract, bilateral chest tubes, mediastinal drain, large   bore inferior ECMO catheter and a left-sided central line catheter with the   tip in the cavoatrial junction again noted.  Probable small left pleural   effusion and left basilar opacity. No gross thorax. Pulmonary vascularity   is within normal limits.      Electronically Signed by: JOSAFAT CALABRESE M.D.    Signed on: 8/6/2023 12:00 PM    Workstation ID: [id number]      XR CHEST PA OR AP 1 VIEW   Final Result   FINDINGS/IMPRESSION:        There is cardiomegaly, evidence of median sternotomy, a left-sided central   line catheter in the cavoatrial junction, [place]-Anali catheter in the   pulmonary outflow tract, bilateral chest tubes, endotracheal tube 4.4 cm   above fabricio, large bore inferior ECMO catheter, LVAD, and electronic   devices chest again noted in place. There is a small left pleural effusion   and left basilar opacity. No gross pneumothorax.      Electronically Signed by: JOSAFAT CALABRESE M.D.    Signed on: 8/6/2023 11:30 AM    Workstation ID: [id number]      XR CHEST AP OR PA   Final Result   FINDINGS/IMPRESSION:        The endotracheal tube is 3.5 cm above fabricio. The rest of the   cardiopulmonary findings and life support devices are grossly unchanged.      Electronically Signed by: JOSAFAT CALABRESE M.D.    Signed on: 8/5/2023 12:14 PM    Workstation ID: [id number]      XR CHEST PA OR AP 1 VIEW   Final Result   FINDINGS/IMPRESSION:       There is cardiomegaly, evidence of median sternotomy, bilateral chest   tubes, mediastinal drain, [place]-Anali catheter in the pulmonary outflow   tract, large bore inferior ECMO catheter, left internal jugular central   line catheter in the cavoatrial junction, electronic cardiac assist device   and LVAD. Mild pulmonary vascular congestion and minimal left basilar   opacity. No gross pneumothorax.      Electronically Signed by: JOSAFAT CALABRESE M.D.    Signed on: 8/5/2023 9:04 AM    Workstation ID: [id number]      XR CHEST PA OR AP 1 VIEW   Final Result       Removal of intra-aortic balloon pump.        Support tubes and electronic device is unchanged.  Stable chest      Electronically Signed  by: MEGAN DURÁN M.D    Signed on: 8/4/2023 9:11 PM    Workstation ID: [id number]      US VASC LOWER EXTREMITY VENOUS DUPLEX LEFT   Final Result      No evidence of deep vein thrombosis of the LEFT lower extremity.         Electronically Signed by: MEGAN DURÁN M.D    Signed on: 8/4/2023 8:53 PM    Workstation ID: [id number]      US VASC EXTREMITY LOWER DUPLEX ARTERIAL   Final Result   Patent left lower extremity arteries without evidence of hemodynamically   significant stenosis or occlusion.      Electronically Signed by: SATYA MURRIETA MD    Signed on: 8/5/2023 7:41 AM    Workstation ID: [id number]      XR CHEST AP OR PA   Final Result      1.   No retained needle identified in the chest.   2.   Support lines and tubes as above.      Findings were discussed with the operating room nurse/technologist at the   end of this dictation.      Electronically Signed by: NICOLE SWAN MD    Signed on: 8/4/2023 12:26 PM    Workstation ID: [id number]      XR CHEST AP OR PA   Final Result      No acute change in cardiopulmonary appearance as described. Devices as   above.       Electronically Signed by: JAIME NUR M.D.    Signed on: 8/4/2023 8:00 AM    Workstation ID: [id number]      XR CHEST AP OR PA   Final Result      Stable cardiomegaly.  Devices as above.      Electronically Signed by: JAIME NUR M.D.    Signed on: 8/3/2023 1:43 PM    Workstation ID: [id number]      XR CHEST AP OR PA   Final Result   FINDINGS / IMPRESSION:      The intra-aortic balloon pump ends in the descending thoracic aorta about   9.5 cm below the top of the aortic arch. The right internal jugular   approach central venous catheter likely ends in the superior vena cava. The   lungs appear relatively clear. No significant pulmonary consolidation,   pleural effusion, or pneumothorax is seen. There is unchanged cardiomegaly   with a left chest pacemaker.      Electronically Signed by: CHESTER GLEASON MD    Signed on:  8/3/2023 8:09 AM    Workstation ID: [id number]      XR CHEST AP OR PA   Final Result   Impression:       IABP radiopaque marker overlies the mid descending thoracic aorta.   No acute cardiopulmonary.      Electronically Signed by: ALLYSON EDMOND MD    Signed on: 8/2/2023 7:56 AM    Workstation ID: [id number]      XR CHEST AP OR PA   Final Result   Impression:    Stable exam.  No acute cardiopulmonary abnormality.      Electronically Signed by: ALLYSON EDMOND MD    Signed on: 8/1/2023 6:28 AM    Workstation ID: [id number]      XR CHEST AP OR PA   Final Result      Stable cardiomegaly. Devices as above.      Electronically Signed by: JAIME NUR M.D.    Signed on: 7/31/2023 7:14 AM    Workstation ID: [id number]      XR CHEST AP OR PA   Final Result       Support devices and tubes unchanged.  Improving right lower lobe   infiltrate      Electronically Signed by: MEGAN DURÁN M.D    Signed on: 7/30/2023 7:59 AM    Workstation ID: [id number]      XR CHEST AP OR PA   Final Result       Support tubes and devices unchanged.  Improving right midlung field   infiltrate      Electronically Signed by: MEGAN DURÁN M.D    Signed on: 7/29/2023 7:46 AM    Workstation ID: [id number]      US THYROID   Final Result    Nonenlarged, mildly heterogeneous thyroid gland which may be seen with   sequelae of thyroiditis.       0.7 cm benign spongiform nodule in the right thyroid lobe, corresponding   with  TR1. No FNA required.       A 2.1 cm exophytic nodule at the posterior, inferior left thyroid lobe   noted on the CT could not be visualized by ultrasound due to the depth and   inferior position, posterior to the clavicles.      Recommendations are based on the 2017 ACR Thyroid Imaging, Reporting And   Data System (TI-RADS)        Electronically Signed by: ALLYSON EDMOND MD    Signed on: 7/28/2023 11:56 AM    Workstation ID: [id number]      XR CHEST AP OR PA   Final Result   FINDINGS / IMPRESSION:       The right internal jugular approach central venous catheter likely ends in   the superior vena cava. The right subclavian approach [place]-Anali catheter   likely ends in the main pulmonary artery. Intra-aortic balloon pump ends   below the aortic arch. There is an unchanged small right pleural effusion   and hazy opacity in the right mid lung. The left lung remains clear. There   is no evidence of pneumothorax. There is unchanged cardiomegaly with a left   chest pacemaker.      Electronically Signed by: CHESTER GLEASON MD    Signed on: 7/28/2023 6:04 AM    Workstation ID: [id number]      CT HEAD WO CONTRAST   Final Result      No evidence of acute intracranial hemorrhage, hydrocephalus, or midline   shift.        Electronically Signed by: INÉS FISH MD    Signed on: 7/27/2023 6:39 PM    Workstation ID: [id number]      CT CHEST ABDOMEN PELVIS WO CONTRAST   Final Result       1.  Normal heart size with triple lumen catheter in the distal SVC,   [place]-Anali tip in the proximal left pulmonary artery and intra-aortic   balloon pump in the proximal descending thoracic aorta.   2.  Large right pleural effusion with subtotal compressive atelectasis of   the right lower lobe.  Otherwise clear chest.   3.  No acute pathology in the abdomen and pelvis.  Uncomplicated   diverticulosis is present.      Electronically Signed by: GEM AGUILAR MD    Signed on: 7/27/2023 7:45 PM    Workstation ID: [id number]      CT MANDIBLE WO CONTRAST   Final Result      No evidence of acute intracranial hemorrhage, hydrocephalus, or midline   shift.        Electronically Signed by: INÉS FISH MD    Signed on: 7/27/2023 6:39 PM    Workstation ID: [id number]      XR CHEST AP OR PA   Final Result      XR CHEST AP OR PA   Final Result   Impression:    Stable exam as above. Support devices as above.      Electronically Signed by: AVTAR ALVAREZ MD    Signed on: 7/26/2023 7:26 AM    Workstation ID: [id number]      XR CHEST AP OR PA    Final Result   Impression:       Intra-aortic balloon pump marker projects over the descending thoracic   aorta, 1.5 cm below the level of the aortic arch.  Remaining support   devices are in unchanged position.        Small right pleural effusion and right basilar airspace disease is   unchanged.      Electronically Signed by: DEMARCUS MCKINNEY M.D.    Signed on: 7/25/2023 5:58 AM    Workstation ID: [id number]      XR CHEST AP OR PA   Final Result   Impression:    1.   [place]-Anali catheter tip terminates the proximal right pulmonary artery.    Other supporting lines and tubes, as above.   2.   Small right pleural effusion with right lower lobe hazy parenchymal   opacities.      Electronically Signed by: ALLYSON EDMOND MD    Signed on: 7/24/2023 4:05 PM    Workstation ID: [id number]      XR CHEST AP OR PA   Final Result   Impression:    Stable exam as above. Support devices as above.      Electronically Signed by: AVTAR ALVAREZ MD    Signed on: 7/24/2023 4:04 PM    Workstation ID: [id number]      XR CHEST AP OR PA   Final Result      XR CHEST AP OR PA   Final Result   Significant improvement of right pleural effusion which is mild to moderate   at this time.      Electronically Signed by: KEVIN VIZCARRA DO    Signed on: 7/23/2023 7:04 AM    Workstation ID: [id number]      US KIDNEY BILATERAL   Final Result       Chronic medical renal disease.         Electronically Signed by: KEVIN VIZCARRA DO    Signed on: 7/22/2023 1:54 PM    Workstation ID: [id number]      US CHEST   Final Result       Right pleural effusion.         Electronically Signed by: KEVIN VIZCARRA DO    Signed on: 7/22/2023 1:37 PM    Workstation ID: [id number]      XR CHEST AP OR PA   Final Result   Impression:    No significant interval change. Support devices as above.      Electronically Signed by: ALLYSON EDMOND MD    Signed on: 7/22/2023 7:40 AM    Workstation ID: [id number]      US VASC EXTREMITY LOWER VENOUS DUPLEX    Final Result      No evidence of acute DVT of the adequately visualized bilateral lower   extremities as above.            Electronically Signed by: AVTAR ALVAREZ MD    Signed on: 7/21/2023 2:19 PM    Workstation ID: [id number]      XR CHEST AP OR PA   Final Result   Impression:    No significant interval change. Support devices as above.      Electronically Signed by: ALLYSON EDMOND MD    Signed on: 7/21/2023 7:52 AM    Workstation ID: [id number]      XR CHEST AP OR PA   Final Result      XR CHEST AP OR PA   Final Result   No significant interval change.      Electronically Signed by: CARLOS GREENFIELD M.D.    Signed on: 7/19/2023 6:49 AM    Workstation ID: [id number]      XR CHEST AP OR PA   Final Result   Impression:    Stable exam as above. Support devices as above.      Electronically Signed by: AVTAR ALVAREZ MD    Signed on: 7/18/2023 7:17 AM    Workstation ID: [id number]      XR CHEST AP OR PA   Final Result   Impression:    1.   Placement of left IJ central venous catheter with the tip likely near   the mid SVC, but obscured of the Woodson-Anali.   2.   Woodson-Anali catheter tip remains in the distal right pulmonary artery.   3.   Intra-aortic radiopaque balloon pump marker overlies the proximal   descending thoracic aorta.   4.   Increase in size of moderate right pleural effusion.      Electronically Signed by: ALLYSON EDMOND MD    Signed on: 7/17/2023 3:46 PM    Workstation ID: [id number]      Cath/PV Case   Final Result      XR CHEST AP OR PA   Final Result    Moderate cardiomegaly.  Moderate right pleural effusion.  Stable support   devices.            Electronically Signed by: SATYA MURRIETA MD    Signed on: 7/17/2023 8:06 AM    Workstation ID: [id number]      XR CHEST AP OR PA   Final Result      XR CHEST AP OR PA   Final Result   1.    Moderate right pleural effusion       Electronically Signed by: ED ARBOLEDA MD    Signed on: 7/15/2023 3:56 PM    Workstation ID: [id number]             Cardiac studies:     Reviewed  Encounter Date: 07/15/23   Electrocardiogram 12-Lead   Result Value    Ventricular Rate EKG/Min (BPM) 91    Atrial Rate (BPM) 91    GA-Interval (MSEC) 100    QRS-Interval (MSEC) 4    QT-Interval (MSEC) 422    QTc 519    R Axis (Degrees) 180    T Axis (Degrees) 91    REPORT TEXT      V paced rhthm   Confirmed by CHAITANYA TAVERAS MD ([id number]) on 8/5/2023 6:28:22 PM             LAST ECHO/ECHO STRESS:  No valid procedures specified.      Problems list    Patient Active Problem List   Diagnosis   • Atrial fibrillation (CMD)   • CAD (coronary artery disease)   • Anticoagulated on Coumadin   • ICD (implantable cardioverter-defibrillator) in place   • Fatigue   • Thrombocytopathia (CMD)           Assessment and Plan    The patient is a 59 yo woman w/ h/o biventricular heart failure s/p ICD w/ RA lead fracture requiring RA/RV lead extraction c/b RV lead perforation requiring repeat extraction and replacement, pAfib s/p ablation, h/o VT s/p DCCT and ablation who was admitted on 7/16 from [facility] for 2nd opinion for evaluation of advanced heart therapies. Reportedly, she was deemed not a candidate for heart transplant at  due to lack of social support.     #Cardiogenic shock due to   #Acute on chronic biventricular heart failure, POA  S/p IABP-removed 8/4  S/p LVAD/RVAD 8/4 + TVR repair  Echo with EF 25%, G2DD, mild-moderate MR; fluid overloaded on admission to [facility] and continues to be despite being on inotropes and IV Bumex.   S/p IABP.  S/p LVAD and temporary RVAD : management per [facility]  Extubated 8/7  - IDRIS  - Bumex  Drip & CRRT  - IV inotropes and pressors per [facility]:   Off dobutamine . Off Milrinone drip  On levophed drip  -Chest tube per [facility]  -Pacer wires  - GDMT initiation when able  - telemetry  - strict Is and Os  -AHF Following  -Vascular surgery following  -[facility] following      #Ventricular tachycardia  S/p prior ablation in January 2023, c/b HD instability and  IABP. Has dual chamber ICD.   - Amiodarone  - monitor electrolytes and replete as appropraite     #Chronic Afib  [id number] 3. On IV heparin and amiodarone. EP following.   - IV heparin drip on hold for thrombocytopenia  - amiodarone/dig held  -Cards following      #ANNETTE requiring hemodialysis, POA  2/2 cardiorenal syndrome vs ATN. Baseline Cr had been <1 but increased since February. Cr on admission 2.2. Renal following. Renal US with chronic medical renal disease.   - CRRT  Right femoral trialysis placed 8/7.Needs new trialysis cath  - IV diuresis with bumex as above  - renally dose meds  - strict Is and Os  - Electrolytes monitoring and correction  -Nephrology following     #Hyponatremia  S/p tolvaptan 7/16.   - HD/CRRT   -Monitor Na level     #Hypokalemia  - replete for goal K >4, Mg >2    # Leukocytosis  -CXR: Unchanged bibasilar atelectasis/airspace disease  -F/u BC 08/08: negative to date  -F/u C diff: negative  -Hypothermic and low concern for infection at this point  -Cefepime & vancomycin 08/08- 08/11  -Monitor off ATB.   -ID following     # Hypothermia  -Post BIVAD  -Warm blanket    #Normocytic anemia  -Transfuse to keep Hb around 7-8 or per [facility] goal.  - Monitor hb     #Thrombocytopenia-improved  Platelets 186 on admission, down to 54 on 7/18. Likely 2/2 IABP, CRRT, iatrogenic. PF4 reportedly negative at [facility]. Peripheral smear fairly unremarkable. PF4 negative 7/18.   - transfusion goals per ICU  - Monitor plts level        # Acute encephalopathy/ICU delirium due to acute illnesses and prolonged hospitalizaiton   Anxiety due to acute illnesses  -Initially was anxious about her surgery, agreeable to surgery and  \"grateful to have a second chance she stated\" however. Had frequent discussions with multidisciplinary team and patient  -Now confused  -management of acute illnesses  -Caution with opioids  -Seroquel initiated 08/09-  -Precedex as needed  EKG 08/05: QT//368   -CTH 08/07: no acute  abnl  08/11: episode of unresponsivess with nw R gaze deviation overnight. Repeated CTH: no acute abnl.   F/u rEEG  F/u ammonia, folate, B12, TSH. F/u UA ( has been on ATB)  Empiric thiamine repletion  -Delirium precaution. Caution with sedative meds  -Monitor mentation  -Neurology on board     # Diarrhea  -C diff negative  -Laxatives holding for diarrhea    # Bloating-resolved  -No nausea/emesis  -Tolerating oral intake  -Laxatives  -She is also getting verticalization therapy and that should help     # Stress/steroid/enteral feeding hyperglycemia  -A1c 5.5 07/20  -SSI/acccuchecks  -Last seen by endocrinology 07/28       #Thyroid nodules  #Abnormal TFTs  Thyroid ultrasound 2/16/23 showed several nodules that were suspicious. Per endo, she had FNA biopsy in 2015 which showed benign follicular nodule with involutional changes.   F/u thyroid US 07/28: Nonenlarged, mildly heterogeneous thyroid gland which may be seen with  sequelae of thyroiditis.0.7cm benign nodule. 2.1cm exophytic nodule  Endocrinology assistance appreciated    # S/p Mump/ID evaluation for VAD  -Viral serology panel positive for Mump Ig M  Asymptomatic, no parotitis, no fever  -EBV capsid ig M neg/IgG pos, EBNA ab IgG pos  -HIV/quantiferon/CMV/toxo/rubella/rubeola neg  -Hep panel negative  -ID following    # Macerated skin coccyx, inner buttocks, perirectum, extremities, not POA  -Per wound care: moisture and friction are contributing factors to skin alterations of coccyx, inner buttocks and enrique rectum.  -Local care with zinc cream barrier, foam dressing  -Wound care following    # Chronic R hip pain s/p LTHA  -CT scan 05/2023  A&P showed  incompletely imaged left hip arthroplasty. There is degenerative change of the spine.  Mild diffuse subcutaneous edema  -Pt has been having pain since her hospitalization at .   -Active ROM   -Continue pain control (voltaren gel, norco) and PT          DVT prophylaxis:. SCD.    Diet:  Dietary Orders (From  admission, onward)     Start     Ordered    08/08/23 1038  Tube Feeding Diet Nepro with Carbsteady/Renal Formula; Without Diet Tray; Water; Before and after medications, Every 6 hours; 30  (Tube Feeding: Provider to Order and Dietitian to Review)  DIET EFFECTIVE NOW        Question Answer Comment   Tube Feeding Products Nepro with Carbsteady/Renal Formula    Administer enteral feeding. Choose schedule Continuous    Method By pump    Initiate enteral feeding at (mL/hr) 20    Increase feeding by (mL/hr) as tolerated until goal is reached 10 mL/hr every 8 hours    Advance to goal rate (mL/hr) 40    Tube Feeding Relationship Without Diet Tray    Flush with Water    Flush frequency Before and after medications    Flush frequency Every 6 hours    Flush volume (mL) 30        08/08/23 1039    08/07/23 1018  Hormel Healthy Shot/Protein Modular, Liquid Peach TF; 1; 1 per day Tube Feeding Nutrition Supplement - Send with tube feeding  As Directed        Question Answer Comment   Frequency As directed    Tube Feeding Supplement Hormel Healthy Shot/Protein Modular, Liquid Peach TF    Quantity of Supplement per Day (specify) 1    Nursing Instructions (specify) 1 per day        08/07/23 1018              Code status:  Code Status Information     Code Status    Full Resuscitation        Activity: ad roly  Disposition: pending clinical course-Diuresis-Post VAD management  PCP: Iris Motley MD    Discussed plan of care with nurse, patient.        Jackie Saleem MD  [facility] Hospitalist  Contact by [employer]         17-Jan-2023

## 2023-09-19 NOTE — OB RN PATIENT PROFILE - NS PRO DEPRESSION SCREENING Y/N6
Samuel Rivera,     If you are due for any health screening(s) below please notify me so we can arrange them to be ordered and scheduled. Most healthy patients at your age complete them, but you are free to accept or refuse.     If you can't do it, I'll definitely understand. If you can, I'd certainly appreciate it!    Tests to Keep You Healthy    Mammogram: Met on 4/29/2023  Colon Cancer Screening: Met on    Cervical Cancer Screening: Met on 4/14/2023  Last Blood Pressure <= 139/89 (9/19/2023): NO      Lets manage your high blood pressure     Your blood pressure was above 140/90 today during your visit. We recommend that you schedule a nurse visit in two weeks to check your blood pressure and discuss ways to support your health goals.     You can also manage your health and record your blood pressure from the comfort of home by keeping a daily blood pressure log. These results are shared with and reviewed by your provider. Please print this form (Daily Blood Pressure Log) to assist you in keeping track of your blood pressure at home.     Schedule your nurse visit in two weeks to learn more about how to track and manage high blood pressure.    Daily Blood Pressure Log    Name:__________________________________                  Date of Birth:_________    Average Blood Pressure:  __________      Date: Time  (a.m.) Blood  Pressure: Pulse  Rate: Time  (p.m.) Blood  Pressure : Pulse  Rate:   Sample 8:37 127/83 84                                                                                                                                     
no

## 2023-11-15 ENCOUNTER — NON-APPOINTMENT (OUTPATIENT)
Age: 33
End: 2023-11-15

## 2023-11-29 NOTE — OB PROVIDER TRIAGE NOTE - INTERNATIONAL TRAVEL
Patient: Morris Christopher    Procedure: Procedure(s):  left lumbar 4-lumbar 5 hemilaminectomy, medial facetectomy, foraminotomies and microdiscectomy       Diagnosis: Lumbar radiculopathy [M54.16]  Diagnosis Additional Information: No value filed.    Anesthesia Type:   General     Note:    Oropharynx: oropharynx clear of all foreign objects and spontaneously breathing  Level of Consciousness: awake  Oxygen Supplementation: face mask  Level of Supplemental Oxygen (L/min / FiO2): 8  Independent Airway: airway patency satisfactory and stable  Dentition: dentition unchanged    Report to RN Given: handoff report given  Patient transferred to: PACU    Handoff Report: Identifed the Patient, Identified the Reponsible Provider, Reviewed the pertinent medical history, Discussed the surgical course, Reviewed Intra-OP anesthesia mangement and issues during anesthesia, Set expectations for post-procedure period and Allowed opportunity for questions and acknowledgement of understanding      Vitals:  Vitals Value Taken Time   /84 11/29/23 1332   Temp 36.2  C (97.2  F) 11/29/23 1325   Pulse 115 11/29/23 1331   Resp 18 11/29/23 1331   SpO2 99 % 11/29/23 1331   Vitals shown include unfiled device data.    Electronically Signed By: JL Camejo CRNA  November 29, 2023  1:33 PM  
No

## 2024-03-18 ENCOUNTER — EMERGENCY (EMERGENCY)
Facility: HOSPITAL | Age: 34
LOS: 1 days | Discharge: HOME | End: 2024-03-18
Attending: EMERGENCY MEDICINE | Admitting: STUDENT IN AN ORGANIZED HEALTH CARE EDUCATION/TRAINING PROGRAM
Payer: COMMERCIAL

## 2024-03-18 VITALS
SYSTOLIC BLOOD PRESSURE: 120 MMHG | RESPIRATION RATE: 18 BRPM | TEMPERATURE: 98 F | DIASTOLIC BLOOD PRESSURE: 62 MMHG | HEART RATE: 82 BPM | OXYGEN SATURATION: 92 %

## 2024-03-18 VITALS
SYSTOLIC BLOOD PRESSURE: 124 MMHG | TEMPERATURE: 99 F | HEART RATE: 84 BPM | DIASTOLIC BLOOD PRESSURE: 85 MMHG | OXYGEN SATURATION: 97 % | RESPIRATION RATE: 18 BRPM

## 2024-03-18 DIAGNOSIS — Z98.891 HISTORY OF UTERINE SCAR FROM PREVIOUS SURGERY: Chronic | ICD-10-CM

## 2024-03-18 DIAGNOSIS — Z98.890 OTHER SPECIFIED POSTPROCEDURAL STATES: Chronic | ICD-10-CM

## 2024-03-18 LAB
ALBUMIN SERPL ELPH-MCNC: 4.1 G/DL — SIGNIFICANT CHANGE UP (ref 3.3–5)
ALP SERPL-CCNC: 67 U/L — SIGNIFICANT CHANGE UP (ref 40–120)
ALT FLD-CCNC: 17 U/L — SIGNIFICANT CHANGE UP (ref 4–33)
ANION GAP SERPL CALC-SCNC: 12 MMOL/L — SIGNIFICANT CHANGE UP (ref 7–14)
APTT BLD: 34.3 SEC — SIGNIFICANT CHANGE UP (ref 24.5–35.6)
AST SERPL-CCNC: 23 U/L — SIGNIFICANT CHANGE UP (ref 4–32)
BASOPHILS # BLD AUTO: 0.04 K/UL — SIGNIFICANT CHANGE UP (ref 0–0.2)
BASOPHILS NFR BLD AUTO: 0.3 % — SIGNIFICANT CHANGE UP (ref 0–2)
BILIRUB SERPL-MCNC: <0.2 MG/DL — SIGNIFICANT CHANGE UP (ref 0.2–1.2)
BLD GP AB SCN SERPL QL: POSITIVE — SIGNIFICANT CHANGE UP
BUN SERPL-MCNC: 11 MG/DL — SIGNIFICANT CHANGE UP (ref 7–23)
CALCIUM SERPL-MCNC: 8.3 MG/DL — LOW (ref 8.4–10.5)
CHLORIDE SERPL-SCNC: 105 MMOL/L — SIGNIFICANT CHANGE UP (ref 98–107)
CO2 SERPL-SCNC: 23 MMOL/L — SIGNIFICANT CHANGE UP (ref 22–31)
CREAT SERPL-MCNC: 0.65 MG/DL — SIGNIFICANT CHANGE UP (ref 0.5–1.3)
EGFR: 118 ML/MIN/1.73M2 — SIGNIFICANT CHANGE UP
EOSINOPHIL # BLD AUTO: 0 K/UL — SIGNIFICANT CHANGE UP (ref 0–0.5)
EOSINOPHIL NFR BLD AUTO: 0 % — SIGNIFICANT CHANGE UP (ref 0–6)
GLUCOSE SERPL-MCNC: 103 MG/DL — HIGH (ref 70–99)
HCT VFR BLD CALC: 41.6 % — SIGNIFICANT CHANGE UP (ref 34.5–45)
HGB BLD-MCNC: 14 G/DL — SIGNIFICANT CHANGE UP (ref 11.5–15.5)
IANC: 10.82 K/UL — HIGH (ref 1.8–7.4)
IMM GRANULOCYTES NFR BLD AUTO: 0.5 % — SIGNIFICANT CHANGE UP (ref 0–0.9)
INR BLD: 1.05 RATIO — SIGNIFICANT CHANGE UP (ref 0.85–1.18)
LYMPHOCYTES # BLD AUTO: 0.71 K/UL — LOW (ref 1–3.3)
LYMPHOCYTES # BLD AUTO: 6 % — LOW (ref 13–44)
MCHC RBC-ENTMCNC: 30.7 PG — SIGNIFICANT CHANGE UP (ref 27–34)
MCHC RBC-ENTMCNC: 33.7 GM/DL — SIGNIFICANT CHANGE UP (ref 32–36)
MCV RBC AUTO: 91.2 FL — SIGNIFICANT CHANGE UP (ref 80–100)
MONOCYTES # BLD AUTO: 0.17 K/UL — SIGNIFICANT CHANGE UP (ref 0–0.9)
MONOCYTES NFR BLD AUTO: 1.4 % — LOW (ref 2–14)
NEUTROPHILS # BLD AUTO: 10.82 K/UL — HIGH (ref 1.8–7.4)
NEUTROPHILS NFR BLD AUTO: 91.8 % — HIGH (ref 43–77)
NRBC # BLD: 0 /100 WBCS — SIGNIFICANT CHANGE UP (ref 0–0)
NRBC # FLD: 0 K/UL — SIGNIFICANT CHANGE UP (ref 0–0)
PLATELET # BLD AUTO: 287 K/UL — SIGNIFICANT CHANGE UP (ref 150–400)
POTASSIUM SERPL-MCNC: 4.3 MMOL/L — SIGNIFICANT CHANGE UP (ref 3.5–5.3)
POTASSIUM SERPL-SCNC: 4.3 MMOL/L — SIGNIFICANT CHANGE UP (ref 3.5–5.3)
PROT SERPL-MCNC: 7.8 G/DL — SIGNIFICANT CHANGE UP (ref 6–8.3)
PROTHROM AB SERPL-ACNC: 11.7 SEC — SIGNIFICANT CHANGE UP (ref 9.5–13)
RBC # BLD: 4.56 M/UL — SIGNIFICANT CHANGE UP (ref 3.8–5.2)
RBC # FLD: 13 % — SIGNIFICANT CHANGE UP (ref 10.3–14.5)
RH IG SCN BLD-IMP: POSITIVE — SIGNIFICANT CHANGE UP
SODIUM SERPL-SCNC: 140 MMOL/L — SIGNIFICANT CHANGE UP (ref 135–145)
WBC # BLD: 11.8 K/UL — HIGH (ref 3.8–10.5)
WBC # FLD AUTO: 11.8 K/UL — HIGH (ref 3.8–10.5)

## 2024-03-18 PROCEDURE — 86077 PHYS BLOOD BANK SERV XMATCH: CPT

## 2024-03-18 PROCEDURE — 71045 X-RAY EXAM CHEST 1 VIEW: CPT | Mod: 26

## 2024-03-18 PROCEDURE — 99285 EMERGENCY DEPT VISIT HI MDM: CPT

## 2024-03-18 RX ADMIN — Medication 300 MILLIGRAM(S): at 20:10

## 2024-03-18 NOTE — ED PROVIDER NOTE - OBJECTIVE STATEMENT
34 y/o female hx asthma presents as transfer from [facility] with left orbital fracture. She tripped over her dog this AM and hit her head on a concrete slab. Her pain is currently well controlled, she denies blurry vision, confusion, LOC, nausea/vomiting, chest pain, SOB. She notes swelling has improved since earlier this morning immediately after fall. Transferring hospital spoke with OMFS Dr. Flores, accepting physician is Dr. Farrell.

## 2024-03-18 NOTE — ED PROVIDER NOTE - PROGRESS NOTE DETAILS
spoke with OR nurse, OMFS currently in surgery, aware of patient SRINATH Middleton: Received patient on signout.  Patient seen by orofacial maxillary surgery and ophthalmology who recommends outpatient follow-up but to be discharged with artificial tears and Augmentin twice daily for 10 days.  Discussed strict return precautions and prompt follow-up as well as sinus precautions.  Patient verbalized an understanding and agrees with the plan SRINATH Middleton: Pt confirmed PCN allergy, therefore will rx clindamycin instead of Augmentin. Upon discharge I also called opthalmology team and they agree with abx change.

## 2024-03-18 NOTE — ED PROVIDER NOTE - PHYSICAL EXAMINATION
General: Alert and Orientated x 3. Appears uncomfortable   Head: Significant periorbital edema left eye.   Eyes: Left eye with subconjunctival blood. PERRL. Acuity 20/20 OD OS. extraocular movements intact but painful on left. No proptosis.   ENT: MMM, Oropharynx clear  Neck: Supple.   Cardiac: Normal S1 and S2 w/ RRR.  Pulmonary: CTA bilaterally. No increased WOB.   Abdominal: Soft, non-tender, non-distended  Neurologic: Patient with cranial nerves intact, equal strength bilaterally, sensation equal bilaterally, no facial droop, clear and coherent speech  Musculoskeletal: No limited ROM.  Skin: Color appropriate for race. Intact, warm, and well-perfused.  Psychiatric: Appropriate mood and affect. No apparent risk to self or others.

## 2024-03-18 NOTE — ED ADULT NURSE NOTE - NSFALLRISKINTERV_ED_ALL_ED

## 2024-03-18 NOTE — ED PROVIDER NOTE - CLINICAL SUMMARY MEDICAL DECISION MAKING FREE TEXT BOX
32 y/o female hx asthma presents as transfer from [facility] with orbital floor fracture sustained this AM from ground level fall. She is hemodynamically stable, afebrile, on exam Left eye with subconjunctival blood. Acuity 20/20 OD OS. PERRL. extraocular movements intact but painful on left. No proptosis. Significant periorbital edema around left eye. No focal neurologic deficits, no concern for open globe, retrobulbar hematoma. Will manage pain, get pre-operative labs, consult OMFS. 32 y/o female hx asthma presents as transfer from [facility] with orbital floor fracture sustained this AM from ground level fall. She is hemodynamically stable, afebrile, on exam Left eye with subconjunctival blood. Acuity 20/20 OD OS. PERRL. extraocular movements intact but painful on left. No proptosis. Significant periorbital edema around left eye. No focal neurologic deficits, no concern for open globe, retrobulbar hematoma. Will manage pain, get pre-operative labs, consult OMFS.    Destinee Lino MD attending physician patient sent from [facility] because of orbital fracture.  Here she lacks diplopia she has ecchymosis around the eye as well as chemosis on the eye itself vision checked by the resident 2020.  Patient without MICHAEL limbic injection or consensual photophobia.  Extraocular muscles are intact.  Patient awake alert appropriate able to communicate well.    Patient without any hand or other extremity injuries.    OMFS called and films were brought to be uploaded

## 2024-03-18 NOTE — ED ADULT TRIAGE NOTE - CHIEF COMPLAINT QUOTE
Pt Claire. from [facility] for left orbit Fx. as per EMS pt trip over her big dog injured left eye.   IV to Right AC 20g, IV NS in progress.   pt had pain meds prior to arrival.

## 2024-03-18 NOTE — ED PROVIDER NOTE - ATTENDING CONTRIBUTION TO CARE
Destinee Lino MD attending physician patient sent from [facility] because of orbital fracture.  Here she lacks diplopia she has ecchymosis around the eye as well as chemosis on the eye itself vision checked by the resident 2020.  Patient without MICHAEL limbic injection or consensual photophobia.  Extraocular muscles are intact.  Patient awake alert appropriate able to communicate well.    Patient without any hand or other extremity injuries.    OMFS called and films were brought to be uploaded    I performed a history and physical exam of the patient and discussed their management with the resident and /or advanced care provider. I personally made/approved the management plan and take responsibility for the patient management. I reviewed the resident and /or ACP's note and agree with the documented findings and plan of care. My medical decison making and observations are found above.

## 2024-03-18 NOTE — CONSULT NOTE ADULT - ASSESSMENT
Assessment and Recommendations:  33y female with a past medical history/ocular history of asthna consulted for orbital floor and medial wall fracture after trauma on the left side, found to have subconjunctival hemorrhage, fractures but otherwise normal eye exam.    #Orbital floor fracture, OS  #Medial orbital wall fracture, OS  #Subconjunctival hemorrhage, OS  - patient fell after tripping over dog this morning and hit the sidewalk with +LOC  - patient denies any changes to vision, no flashes, no floaters, no diplopia, no transient vision loss, no nausea or vomiting or palpitations on EOMs  - VA 20/20 OU, IOP wnl, EOMI with mild pain on adduction but no diplopia, CVF full OU  - anterior exam with subconjunctival hemorrhage superiorly OS with no lacerations or concern for foreign body or rupture on manipulation  - dilated exam within normal limits in both eyes  - recommend augmentin BID for 10 days  - recommend sinus precautions  - can use artificial tears for discomfort 2/2 subconj heme  - should follow up outpatient with oculoplastics at below address or with FS    Outpatient Follow-up: Patient should follow-up with his/her ophthalmologist or with [facility] Department of Ophthalmology within 1 week of after discharge at:    [address]  [place] [address]  [phone]    Joe Kinney MD, PGY3  Also available on [employer] Teams

## 2024-03-18 NOTE — CONSULT NOTE ADULT - SUBJECTIVE AND OBJECTIVE BOX
[facility] DEPARTMENT OF OPHTHALMOLOGY - INITIAL ADULT CONSULT  -----------------------------------------------------------------------------------------------------------------  Joe Kinney MD, PGY3  Available on [facility] Teams  -----------------------------------------------------------------------------------------------------------------    HPI:    33F w/ PMH of Asthma, presents to [facility] ED as transfer from [facility] s/p mechanical fall this morning. Pt was walking outside of her house when she tripped and fell over her dog, hitting her head on the concrete. (+) LOC . Pt reports she woke up after about a minute, fall was witnessed by her . On examination today, pt reporting mild periorbital pain, otherwise pt w/ no acute complaints today. Pt was given Decadron at [facility], pain is currently well controlled. Denies any fever, chills, difficulty breathing or swallowing or blurry vision. Current pain level 3/10.    Interval History: denies any flashes, floaters, curtain over vision, diplopia, nausea or vomiting on eye movements. Endorses mild pain when looking medially    Past Medical History: asthma  Past Ocular History: none  Drops: none  Medications: see primary note  Allergies: penicillin, tree nuts, fish, peanuts, shellfish  Family History: denies  Surgical History: denies  Outpatient Ophthalmologist: none      Review of Systems:  Constitutional: No fever, chills  Eyes: No blurry vision, flashes, floaters, FBS, erythema, discharge, double vision, OU  Neuro: No tremors  Cardiovascular: No chest pain, palpitations  Respiratory: No SOB, no cough  GI: No nausea, vomiting, abdominal pain    Vital Signs: T(C): 37.1 (03-18-24 @ 18:37)  T(F): 98.8 (03-18-24 @ 18:37), Max: 98.8 (03-18-24 @ 18:37)  HR: 84 (03-18-24 @ 18:37) (82 - 84)  BP: 124/85 (03-18-24 @ 18:37) (120/62 - 124/85)  RR:  (18 - 18)  SpO2:  (92% - 97%)  Wt(kg): --  AAOx3    Ophthalmology Exam:  Visual acuity (sc): 20/20 OD. 20/20 OS  Pupils: PERRL OU, no APD  Intraocular Pressure:  STP OU  Extraocular movements (EOMs): Full OU, no pain, no diplopia.  Confrontational Visual Field (CVF): Full OD Full OS    Pen Light Exam (PLE)  External: Normal OD periorbital ecchymosis OS  Lids/Lashes/Lacrimal Ducts: Flat OD 1+ edema and ecchymosis of the lids inferior > superior  Sclera/Conjunctiva: White and quiet OD. 120 degree subconj heme with hematoma superiorly, minimal subconj heme inferiorly, no conj lacerations, no foreign body appreciated  Cornea: Clear OU. no epithelial defects, no cornea abrasions  Anterior Chamber: Deep and formed OU.    Iris: Flat OU.  Lens: Clear OU.    Fundus Exam: dilated with 1% tropicamide and 2.5% phenylephrine  Approval obtained from primary team for dilation  Patient aware that pupils can remained dilated for at least 4-6 hours.  Exam performed with 20 D lens    Vitreous: wnl OU  Disc, cup/disc: sharp and pink, 0.3 OU  Macula: wnl OU  Vessels: wnl OU  Periphery: wnl OU    Labs/Imaging:  From outside hospital  L orbital floor and L medial wall fractures

## 2024-03-18 NOTE — ED PROVIDER NOTE - NSFOLLOWUPINSTRUCTIONS_ED_ALL_ED_FT
Sinus precautions (no straws, no smoking, sneeze w/ mouth open)    -F/u outpatient at [facility] in 1 week. Please call [phone] w/ any questions or concerns. ***  You were seen in the emergency room and found to have a orbital floor fracture due to a fall.  You were seen by orofacial maxillary surgery team and ophthalmology who recommended that you follow-up outpatient the follow-up information is provided below, and to use artificial tears for comfort in addition to this you are also been prescribed an antibiotic amoxicillin clavulanic acid take 1 tablet by mouth twice a day for 10 days.  Please return if any new, worsening, or concerning symptoms develop.    - Please follow sinus precautions (no straws, no smoking, sneeze w/ mouth open)  - Follow up with outpatient at [facility] in 1 week. Please call [phone] w/ any questions or concerns.  - please follow up with  outpatient with oculoplastics in 1 week. [address] [place] [address], call [phone]    **    Follow these instructions at home:  Medicines    Take over-the-counter and prescription medicines only as told by your health care provider.  If you were prescribed an antibiotic medicine, take it as told by your health care provider. Do not stop taking the antibiotic even if you start to feel better.  Managing pain, bruising, and swelling    A bag of ice on a towel on the skin.   If directed, put ice on the injured eye. To do this:  Put ice in a plastic bag.  Place a towel between your skin and the bag.  Leave the ice on for 20 minutes, 2–3 times a day.  Remove the ice if your skin turns bright red. This is very important. If you cannot feel pain, heat, or cold, you have a greater risk of damage to the area.  If recommended by your health care provider, put one or two extra pillows under your head when you are lying down.  Activity    Do not drive or operate machinery until your health care provider says that it is safe. Be aware that if you are using only one eye to see, you may have trouble judging distances (depth perception). Ask your health care provider when it is safe to drive.  Avoid traveling by plane or going to high-altitude areas. This may slow the healing of your swelling and may increase sinus pain.  Do not lift anything that is heavier than 10 lb (4.5 kg), or the limit that you are told, until your health care provider says that it is safe.  Return to your normal activities as told by your health care provider. Ask your health care provider what activities are safe for you.  Follow instructions from your health care provider about wearing protective glasses or goggles for work.  General instructions    Do not touch, rub, or try to move your eye.  Do not blow your nose until your health care provider says that you can.  Do not put a contact lens in the injured eye until your health care provider says that you can.  Stay away from carlos areas.  Do not use any products that contain nicotine or tobacco. These products include cigarettes, chewing tobacco, and vaping devices, such as e-cigarettes. If you need help quitting, ask your health care provider.  Do not take baths, swim, or use a hot tub until your health care provider approves. Ask your health care provider if you may take showers. You may only be allowed to take sponge baths.  Keep all follow-up visits. This is important.  Contact a health care provider if:  You have vision changes, such as increased blurriness or worsening double vision.  You feel pain when you move your eyes.  You feel nauseous or light-headed when you move your eyes.  You have redness or swelling that does not go away or gets worse.  You have blood or fluid coming from your nose.  You have a fever or a headache.  Get help right away if:  You have a sensation of seeing flashing lights.  You have sudden blindness.  You have sudden bulging of the injured eye.  Your heart is beating much more slowly than normal.  You feel dizzy or you faint.  You have chest pain.  You are short of breath.  These symptoms may represent a serious problem that is an emergency. Do not wait to see if the symptoms will go away. Get medical help right away. Call your local emergency services (911 in the [place] Do not drive yourself to the hospital.    Summary  An orbital fracture is a break in the orbit or eye socket, which is the bony structure that protects the eye.  This condition usually causes swelling and pain around the injured eye, and you may have bruising or vision loss. You may also have trouble looking up, down, or side to side.  Treatment depends on the severity and type of fracture and if there are any serious vision problems. Often, the only treatment is waiting until the swelling goes down. More serious symptoms may indicate the need for emergency surgery.  You should notdrive until your health care provider says that it is safe. Return to your normal activities as told by your health care provider. It is important to keep all follow-up visits. ***  You were seen in the emergency room and found to have a orbital floor fracture due to a fall.  You were seen by orofacial maxillary surgery team and ophthalmology who recommended that you follow-up outpatient the follow-up information is provided below, and to use artificial tears for comfort in addition to this you are also been prescribed an antibiotic clindamycin three times a day for 7 days.  Please return if any new, worsening, or concerning symptoms develop.    - Please follow sinus precautions (no straws, no smoking, sneeze w/ mouth open)  - Follow up with outpatient at [facility] in 1 week. Please call [phone] w/ any questions or concerns.  - please follow up with  outpatient with oculoplastics in 1 week. [address] [place] [address], call [phone]    **    Follow these instructions at home:  Medicines    Take over-the-counter and prescription medicines only as told by your health care provider.  If you were prescribed an antibiotic medicine, take it as told by your health care provider. Do not stop taking the antibiotic even if you start to feel better.  Managing pain, bruising, and swelling    A bag of ice on a towel on the skin.   If directed, put ice on the injured eye. To do this:  Put ice in a plastic bag.  Place a towel between your skin and the bag.  Leave the ice on for 20 minutes, 2–3 times a day.  Remove the ice if your skin turns bright red. This is very important. If you cannot feel pain, heat, or cold, you have a greater risk of damage to the area.  If recommended by your health care provider, put one or two extra pillows under your head when you are lying down.  Activity    Do not drive or operate machinery until your health care provider says that it is safe. Be aware that if you are using only one eye to see, you may have trouble judging distances (depth perception). Ask your health care provider when it is safe to drive.  Avoid traveling by plane or going to high-altitude areas. This may slow the healing of your swelling and may increase sinus pain.  Do not lift anything that is heavier than 10 lb (4.5 kg), or the limit that you are told, until your health care provider says that it is safe.  Return to your normal activities as told by your health care provider. Ask your health care provider what activities are safe for you.  Follow instructions from your health care provider about wearing protective glasses or goggles for work.  General instructions    Do not touch, rub, or try to move your eye.  Do not blow your nose until your health care provider says that you can.  Do not put a contact lens in the injured eye until your health care provider says that you can.  Stay away from carlos areas.  Do not use any products that contain nicotine or tobacco. These products include cigarettes, chewing tobacco, and vaping devices, such as e-cigarettes. If you need help quitting, ask your health care provider.  Do not take baths, swim, or use a hot tub until your health care provider approves. Ask your health care provider if you may take showers. You may only be allowed to take sponge baths.  Keep all follow-up visits. This is important.  Contact a health care provider if:  You have vision changes, such as increased blurriness or worsening double vision.  You feel pain when you move your eyes.  You feel nauseous or light-headed when you move your eyes.  You have redness or swelling that does not go away or gets worse.  You have blood or fluid coming from your nose.  You have a fever or a headache.  Get help right away if:  You have a sensation of seeing flashing lights.  You have sudden blindness.  You have sudden bulging of the injured eye.  Your heart is beating much more slowly than normal.  You feel dizzy or you faint.  You have chest pain.  You are short of breath.  These symptoms may represent a serious problem that is an emergency. Do not wait to see if the symptoms will go away. Get medical help right away. Call your local emergency services (911 in the [place] Do not drive yourself to the hospital.    Summary  An orbital fracture is a break in the orbit or eye socket, which is the bony structure that protects the eye.  This condition usually causes swelling and pain around the injured eye, and you may have bruising or vision loss. You may also have trouble looking up, down, or side to side.  Treatment depends on the severity and type of fracture and if there are any serious vision problems. Often, the only treatment is waiting until the swelling goes down. More serious symptoms may indicate the need for emergency surgery.  You should notdrive until your health care provider says that it is safe. Return to your normal activities as told by your health care provider. It is important to keep all follow-up visits.

## 2024-03-18 NOTE — CONSULT NOTE ADULT - ASSESSMENT
33F w/ PMH of Asthma, presents to [facility] ED as transfer from [facility] s/p mechanical fall this morning. OMFS consulted for need of operative management for L orbital floor and left medial wall fractures.     Plan:  PENDING       Jorge Mills DMD  Oral and Maxillofacial Surgery  [facility] OMFS Pager [phone]  Available on [employer] Teams 33F w/ PMH of Asthma, presents to [facility] ED as transfer from [facility] s/p mechanical fall this morning. OMFS consulted for need of operative management for L orbital floor and L medial wall fractures.     Plan:  -No acute OMFS intervention  -Sinus precautions (no straws, no smoking, sneeze w/ mouth open)  -Recommend ophtho consult  -Multimodal pain control   -[facility]. Please call [phone] w/ any questions or concerns.         Jorge Mills DMD  Oral and Maxillofacial Surgery  [facility] Pager [phone]  Available on [employer] Teams

## 2024-03-18 NOTE — CONSULT NOTE ADULT - SUBJECTIVE AND OBJECTIVE BOX
OMFS Consult Note:    PAUL ENCISO  MRN-[id number]  [facility] ED.    HPI:  33F w/ PMH of Asthma, presents to [facility] ED as transfer from [facility] s/p mechanical fall this morning. Pt was walking outside of her house when she tripped and fell over her dog, hitting her head on the concrete. (+) LOC . Pt reports she woke up after about a minute, fall was witnessed by her . On examination today, pt reporting mild periorbital pain, otherwise pt w/ no acute complaints today. Pt was given Decadron at [facility], pain is currently well controlled. Denies any fever, chills, difficulty breathing or swallowing or blurry vision. Current pain level 3/10.     PAST MEDICAL & SURGICAL HISTORY:  Asthma  deneis any recent exacerbation      History of eczema      History of  section        H/O foot surgery  left leg- childhood      History of colonoscopy      One previous induced termination of pregnancy      History of D&C      Allergies    fish (Anaphylaxis)  peanuts (Anaphylaxis)  Tree Nuts (Anaphylaxis)  shellfish (Anaphylaxis)  penicillin (Unknown)    Intolerances      Social History: Denies ETOH use, Tobacco, drugs    Review of systems:  Denies fever, chills. denies nausea/vomiting/headache. denies CP, SOB, cough. Denies palpatitions. denies blurred/double vision. denies dyspahgia, dyspnea.      T(F): 98.3 (24 @ 14:32), Max: 98.3 (24 @ 14:32)  HR: 82 (24 @ 14:32) (82 - 82)  BP: 120/62 (24 @ 14:32) (120/62 - 120/62)  RR: 18 (24 @ 14:32) (18 - 18)  SpO2: 92% (24 @ 14:32) (92% - 92%)      Labs:                          14.0   11.80 )-----------( 287      ( 18 Mar 2024 16:50 )             41.6         140  |  105  |  11  ----------------------------<  103<H>  4.3   |  23  |  0.65    Ca    8.3<L>      18 Mar 2024 16:50    TPro  7.8  /  Alb  4.1  /  TBili  <0.2  /  DBili  x   /  AST  23  /  ALT  17  /  AlkPhos  67  03-18    Urinalysis Basic - ( 18 Mar 2024 16:50 )    Color: x / Appearance: x / SG: x / pH: x  Gluc: 103 mg/dL / Ketone: x  / Bili: x / Urobili: x   Blood: x / Protein: x / Nitrite: x   Leuk Esterase: x / RBC: x / WBC x   Sq Epi: x / Non Sq Epi: x / Bacteria: x      PT/INR - ( 18 Mar 2024 16:50 )   PT: 11.7 sec;   INR: 1.05 ratio         PTT - ( 18 Mar 2024 16:50 )  PTT:34.3 sec      PHYSICAL EXAM:    Gen: AAox3, NAD  Head: no Lacerations/abrasions/hematomas. no edema/erythema/tenderness to palpation. no asymmetry. no signs of scalp infection  Eyes: EOMI, PERRL, visual acuity intact, no diplopia,  supra/infra orbital rims intact, mild subconjunctival heme, no telecanthus, no exophthalmos  Ears: Gross hearing intact, No heme appreciated, Condylar head palpated  Nose: no crepitus/septal hematoma/asymmetry.  no Lacerations/abrasions/hematomas.  Malar: no malar depression, no CN V-2 paresthesia  Throat: no LAD, supple, FROM, no lesions    Extraoral/Intraoral Exam: HERNANDO: 35. occlusion stable and reproducible, no lacerations/abrasions/hematomas, no mandibular step deformity, no CN V-3 paresthesia, no signs of infection, no edema/erythema/tenderness to palpation. FOM soft, NT. no mobility of maxilla/crepitis.   CN II-XII intact      IMAGING:     OMFS Consult Note:    PAUL ENCISO  MRN-[id number]  [facility] ED.    HPI:  33F w/ PMH of Asthma, presents to [facility] ED as transfer from [facility] s/p mechanical fall this morning. Pt was walking outside of her house when she tripped and fell over her dog, hitting her head on the concrete. (+) LOC . Pt reports she woke up after about a minute, fall was witnessed by her . On examination today, pt reporting mild periorbital pain, otherwise pt w/ no acute complaints today. Pt was given Decadron at [facility], pain is currently well controlled. Denies any fever, chills, difficulty breathing or swallowing or blurry vision. Current pain level 3/10.     PAST MEDICAL & SURGICAL HISTORY:  Asthma      History of eczema      History of  section        H/O foot surgery  left leg- childhood      History of colonoscopy      One previous induced termination of pregnancy      History of D&C      Allergies    fish (Anaphylaxis)  peanuts (Anaphylaxis)  Tree Nuts (Anaphylaxis)  shellfish (Anaphylaxis)  penicillin (Unknown)    Intolerances      Social History: Denies ETOH use, Tobacco, drugs    Review of systems:  Denies fever, chills. denies nausea/vomiting/headache. denies CP, SOB, cough. Denies palpatitions. denies blurred/double vision. denies dyspahgia, dyspnea.      T(F): 98.3 (24 @ 14:32), Max: 98.3 (24 @ 14:32)  HR: 82 (24 @ 14:32) (82 - 82)  BP: 120/62 (24 @ 14:32) (120/62 - 120/62)  RR: 18 (24 @ 14:32) (18 - 18)  SpO2: 92% (24 @ 14:32) (92% - 92%)      Labs:                          14.0   11.80 )-----------( 287      ( 18 Mar 2024 16:50 )             41.6         140  |  105  |  11  ----------------------------<  103<H>  4.3   |  23  |  0.65    Ca    8.3<L>      18 Mar 2024 16:50    TPro  7.8  /  Alb  4.1  /  TBili  <0.2  /  DBili  x   /  AST  23  /  ALT  17  /  AlkPhos  67  03-18    Urinalysis Basic - ( 18 Mar 2024 16:50 )    Color: x / Appearance: x / SG: x / pH: x  Gluc: 103 mg/dL / Ketone: x  / Bili: x / Urobili: x   Blood: x / Protein: x / Nitrite: x   Leuk Esterase: x / RBC: x / WBC x   Sq Epi: x / Non Sq Epi: x / Bacteria: x      PT/INR - ( 18 Mar 2024 16:50 )   PT: 11.7 sec;   INR: 1.05 ratio         PTT - ( 18 Mar 2024 16:50 )  PTT:34.3 sec      PHYSICAL EXAM:    Gen: AAox3, NAD  Head: no Lacerations/abrasions/hematomas. no edema/erythema/tenderness to palpation. no asymmetry. no signs of scalp infection  Eyes: EOMI, PERRL, visual acuity intact, no diplopia,  supra/infra orbital rims intact, mild subconjunctival heme, no telecanthus, no exophthalmos  Ears: Gross hearing intact, No heme appreciated, Condylar head palpated  Nose: no crepitus/septal hematoma/asymmetry.  no Lacerations/abrasions/hematomas.  Malar: no malar depression, no CN V-2 paresthesia  Throat: no LAD, supple, FROM, no lesions    Extraoral/Intraoral Exam: HERNANDO: 35. occlusion stable and reproducible, no lacerations/abrasions/hematomas, no mandibular step deformity, no CN V-3 paresthesia, no signs of infection, no edema/erythema/tenderness to palpation. FOM soft, NT. no mobility of maxilla/crepitis.   CN II-XII intact

## 2024-03-18 NOTE — ED ADULT NURSE NOTE - OBJECTIVE STATEMENT
Pt s/p fall earlier this  morning tripped over her dog, arrives with bruising over left eye that is shut. Pt arrives with iv to right a/c., pre  op labs collected. awaiting dispo.

## 2024-03-21 ENCOUNTER — APPOINTMENT (OUTPATIENT)
Age: 34
End: 2024-03-21
Payer: COMMERCIAL

## 2024-03-21 ENCOUNTER — NON-APPOINTMENT (OUTPATIENT)
Age: 34
End: 2024-03-21

## 2024-03-21 PROCEDURE — 92014 COMPRE OPH EXAM EST PT 1/>: CPT

## 2024-04-12 ENCOUNTER — NON-APPOINTMENT (OUTPATIENT)
Age: 34
End: 2024-04-12

## 2024-04-12 ENCOUNTER — APPOINTMENT (OUTPATIENT)
Dept: OPHTHALMOLOGY | Facility: CLINIC | Age: 34
End: 2024-04-12
Payer: COMMERCIAL

## 2024-04-12 PROCEDURE — 92285 EXTERNAL OCULAR PHOTOGRAPHY: CPT

## 2024-04-12 PROCEDURE — 99214 OFFICE O/P EST MOD 30 MIN: CPT

## 2024-05-31 ENCOUNTER — APPOINTMENT (OUTPATIENT)
Dept: OPHTHALMOLOGY | Facility: CLINIC | Age: 34
End: 2024-05-31